# Patient Record
Sex: FEMALE | Race: WHITE | Employment: OTHER | URBAN - METROPOLITAN AREA
[De-identification: names, ages, dates, MRNs, and addresses within clinical notes are randomized per-mention and may not be internally consistent; named-entity substitution may affect disease eponyms.]

---

## 2019-01-23 ENCOUNTER — HOSPITAL ENCOUNTER (OUTPATIENT)
Dept: PREADMISSION TESTING | Age: 69
Discharge: HOME | End: 2019-01-23
Payer: MEDICARE

## 2019-01-23 VITALS
HEIGHT: 66 IN | WEIGHT: 189.38 LBS | DIASTOLIC BLOOD PRESSURE: 76 MMHG | TEMPERATURE: 97.7 F | RESPIRATION RATE: 18 BRPM | SYSTOLIC BLOOD PRESSURE: 143 MMHG | HEART RATE: 51 BPM | BODY MASS INDEX: 30.44 KG/M2

## 2019-01-23 LAB
ABO + RH BLD: NORMAL
ANION GAP SERPL CALC-SCNC: 8 MMOL/L (ref 5–15)
APPEARANCE UR: ABNORMAL
ATRIAL RATE: 52 BPM
BACTERIA URNS QL MICRO: ABNORMAL /HPF
BILIRUB UR QL: NEGATIVE
BLOOD GROUP ANTIBODIES SERPL: NORMAL
BUN SERPL-MCNC: 21 MG/DL (ref 6–20)
BUN/CREAT SERPL: 22 (ref 12–20)
CALCIUM SERPL-MCNC: 9.8 MG/DL (ref 8.5–10.1)
CALCULATED P AXIS, ECG09: 35 DEGREES
CALCULATED R AXIS, ECG10: -22 DEGREES
CALCULATED T AXIS, ECG11: 129 DEGREES
CHLORIDE SERPL-SCNC: 103 MMOL/L (ref 97–108)
CO2 SERPL-SCNC: 28 MMOL/L (ref 21–32)
COLOR UR: ABNORMAL
CREAT SERPL-MCNC: 0.95 MG/DL (ref 0.55–1.02)
DIAGNOSIS, 93000: NORMAL
DIGOXIN SERPL-MCNC: 1.3 NG/ML (ref 0.9–2)
EPITH CASTS URNS QL MICRO: ABNORMAL /LPF
ERYTHROCYTE [DISTWIDTH] IN BLOOD BY AUTOMATED COUNT: 13.8 % (ref 11.5–14.5)
EST. AVERAGE GLUCOSE BLD GHB EST-MCNC: 166 MG/DL
GLUCOSE SERPL-MCNC: 125 MG/DL (ref 65–100)
GLUCOSE UR STRIP.AUTO-MCNC: NEGATIVE MG/DL
HBA1C MFR BLD: 7.4 % (ref 4.2–6.3)
HCT VFR BLD AUTO: 43.2 % (ref 35–47)
HGB BLD-MCNC: 14.2 G/DL (ref 11.5–16)
HGB UR QL STRIP: NEGATIVE
INR PPP: 1 (ref 0.9–1.1)
KETONES UR QL STRIP.AUTO: NEGATIVE MG/DL
LEUKOCYTE ESTERASE UR QL STRIP.AUTO: ABNORMAL
MCH RBC QN AUTO: 29.8 PG (ref 26–34)
MCHC RBC AUTO-ENTMCNC: 32.9 G/DL (ref 30–36.5)
MCV RBC AUTO: 90.6 FL (ref 80–99)
NITRITE UR QL STRIP.AUTO: NEGATIVE
NRBC # BLD: 0 K/UL (ref 0–0.01)
NRBC BLD-RTO: 0 PER 100 WBC
P-R INTERVAL, ECG05: 172 MS
PH UR STRIP: 5 [PH] (ref 5–8)
PLATELET # BLD AUTO: 309 K/UL (ref 150–400)
PMV BLD AUTO: 10.2 FL (ref 8.9–12.9)
POTASSIUM SERPL-SCNC: 4.8 MMOL/L (ref 3.5–5.1)
PROT UR STRIP-MCNC: NEGATIVE MG/DL
PROTHROMBIN TIME: 10.6 SEC (ref 9–11.1)
Q-T INTERVAL, ECG07: 502 MS
QRS DURATION, ECG06: 78 MS
QTC CALCULATION (BEZET), ECG08: 466 MS
RBC # BLD AUTO: 4.77 M/UL (ref 3.8–5.2)
RBC #/AREA URNS HPF: ABNORMAL /HPF (ref 0–5)
SODIUM SERPL-SCNC: 139 MMOL/L (ref 136–145)
SP GR UR REFRACTOMETRY: 1.02 (ref 1–1.03)
SPECIMEN EXP DATE BLD: NORMAL
UA: UC IF INDICATED,UAUC: ABNORMAL
UROBILINOGEN UR QL STRIP.AUTO: 0.2 EU/DL (ref 0.2–1)
VENTRICULAR RATE, ECG03: 52 BPM
WBC # BLD AUTO: 9 K/UL (ref 3.6–11)
WBC URNS QL MICRO: ABNORMAL /HPF (ref 0–4)

## 2019-01-23 PROCEDURE — 86900 BLOOD TYPING SEROLOGIC ABO: CPT

## 2019-01-23 PROCEDURE — 80048 BASIC METABOLIC PNL TOTAL CA: CPT

## 2019-01-23 PROCEDURE — 36415 COLL VENOUS BLD VENIPUNCTURE: CPT

## 2019-01-23 PROCEDURE — 85610 PROTHROMBIN TIME: CPT

## 2019-01-23 PROCEDURE — 85027 COMPLETE CBC AUTOMATED: CPT

## 2019-01-23 PROCEDURE — 80162 ASSAY OF DIGOXIN TOTAL: CPT

## 2019-01-23 PROCEDURE — 93005 ELECTROCARDIOGRAM TRACING: CPT

## 2019-01-23 PROCEDURE — 83036 HEMOGLOBIN GLYCOSYLATED A1C: CPT

## 2019-01-23 PROCEDURE — 81001 URINALYSIS AUTO W/SCOPE: CPT

## 2019-01-23 PROCEDURE — 87086 URINE CULTURE/COLONY COUNT: CPT

## 2019-01-23 RX ORDER — DULOXETIN HYDROCHLORIDE 60 MG/1
60 CAPSULE, DELAYED RELEASE ORAL DAILY
COMMUNITY
End: 2022-04-05

## 2019-01-23 RX ORDER — GLIPIZIDE 5 MG/1
5 TABLET ORAL 2 TIMES DAILY
COMMUNITY

## 2019-01-23 RX ORDER — METFORMIN HYDROCHLORIDE 1000 MG/1
1000 TABLET ORAL 2 TIMES DAILY WITH MEALS
COMMUNITY
End: 2022-03-14

## 2019-01-23 RX ORDER — DIGOXIN 250 MCG
0.25 TABLET ORAL DAILY
COMMUNITY
End: 2022-03-14

## 2019-01-23 RX ORDER — DILTIAZEM HYDROCHLORIDE EXTENDED-RELEASE TABLETS 240 MG/1
240 TABLET, EXTENDED RELEASE ORAL DAILY
COMMUNITY
End: 2022-04-05

## 2019-01-23 RX ORDER — SOTALOL HYDROCHLORIDE 120 MG/1
120 TABLET ORAL 2 TIMES DAILY
COMMUNITY

## 2019-01-23 RX ORDER — NEBIVOLOL 10 MG/1
10 TABLET ORAL DAILY
COMMUNITY
End: 2022-04-05

## 2019-01-23 RX ORDER — ATORVASTATIN CALCIUM 20 MG/1
20 TABLET, FILM COATED ORAL DAILY
COMMUNITY

## 2019-01-23 RX ORDER — ALLOPURINOL 300 MG/1
300 TABLET ORAL DAILY
COMMUNITY

## 2019-01-23 RX ORDER — METOPROLOL TARTRATE 25 MG/1
12.5 TABLET, FILM COATED ORAL 2 TIMES DAILY
COMMUNITY
End: 2019-01-23

## 2019-01-24 LAB
BACTERIA SPEC CULT: NORMAL
CC UR VC: NORMAL
SERVICE CMNT-IMP: NORMAL
SERVICE CMNT-IMP: NORMAL

## 2019-01-24 NOTE — PERIOP NOTES
Dr. Malathi Prince office called and voice message left with Radha Rodrigues MA, regarding the following abnormal lab values:  Glucose-125 (H), JdmV0P-6.4 (H), BUN-21 (H). Patient's EKG was sent to anesthesiology for review and approved by Dr. Asiya Fabian. All labs and EKG faxed to Dr. Malathi Prince office.

## 2019-01-31 NOTE — H&P
Ebonie Fair  Location: [place] [address] [address]  Patient #: [id number]  : 1950   / Language: [place] / Race: White  Female      History of Present Illness  The patient is a 77year old female who presents for a Recheck of Neck pain. This condition occurred without any known injury. The last clinic visit was month(s) ago. Symptoms include neck pain and neck stiffness. Symptoms are located in the entire neck. The patient describes the pain as aching and burning. Onset was gradual 2 month(s) ago. The symptoms occur constantly. The patient describes symptoms as moderate in severity and worsening. Symptoms are exacerbated by neck movement. The patient was previously evaluated in this clinic year(s) ago. Past evaluation has included cervical spine x-rays and cervical spine MRI. Past treatment has included spinal surgery (). Note for \"Neck pain\":    Ms. Juan Pablo Conte comes in today for evaluation of neck and left arm pain.  She states that the pain began about a week ago. Orquidea Lira does not recall any particular injuries.  The pain radiates somewhat into the left hand.  No balance problems. Patient's history is significant for a previous cervical fusion several years ago.       Problem List/Past Medical  Essential Hypertension    Patellofemoral arthritis of right knee (716.96  M17.11)    Chronic pain of right knee (719.46  M25.561)    Subluxation of peroneal tendon (837.0) (837.0  S93.06XA)    REVIEW OF SYSTEMS: Systems were reviewed by the provider.    Posterior tibial tendinitis, left (726.72  Y32.645)    Trochanteric bursitis, left hip (726.5  M70.62)    Effusion, right knee (719.06  M25.461)    RADICULITIS (723.4)    Patient was referred to their primary care physician for Blood Pressure screening.    Pain and swelling of ankle, left (719.47  M25.572, M25.472)    Surgical followup (V67.00  Z09)    Tear of peroneal tendon, left, subsequent encounter (V58.89  B95.880C)    CERVICALGIA (723.1)    Weight above 97th percentile (V49.89  Z78.9)    Osteochondral lesion of talar dome (733.90  M89.9, M94.9)    Tear of peroneal tendon, left, initial encounter (844.8  S86.312A)    DDD (722.4)    SPONDYLOSIS (721.0)      Allergies   No Known Drug Allergies       Family History   Diabetes Mellitus    Arthritis    Hypertension    Cancer    Heart Disease    Cerebrovascular Accident      Social History   Caffeine use  : Drinks tea 3-4 times a day. Sun Exposure  : rarely  Tobacco use   Never smoker. Seat Belt Use  : always  Illicit drug use  : none  HIV risk factors  : no  Alcohol use  : Never drinks. Medication History  Aspirin  (81MG Tablet Chewable, Oral) Active. Medications Reconciled     Past Surgical History  Spinal Surgery    Tonsillectomy    Spinal Decompression   lower back  Tubal Ligation    Hysterectomy   non-cancerous: partial and abdominal    Diagnostic Studies History   Cervical Spine X-ray   Date: 12/16/2014, Results: . AP and lateral x-rays of the cervical spine show a stable fusion at C6-7. Instrumentation is good position. There is severe degenerative disc disease with spondylosis and significant disc space collapse at C5-6. There is significant facet arthrosis worse on the left throughout the cervical spine. Other Problems   Oophorectomy bilateral  Diabetes Mellitus    Depression    Arthritis    Treatment options were discussed with the patient in full.    Peroneal tendinitis (726.79) (726.79  M76.70)    Unspecified Diagnosis      Vitals  Weight: 183 lb   Height: 66 in   Body Surface Area: 1.93 m²   Body Mass Index: 29.54 kg/m²        Physical Exam  Neurologic  Overall Assessment of Muscle Strength and Tone reveals  Upper Extremities - Right Deltoid - 5/5. Left Deltoid - 3+/5. Right Bicep - 5/5. Left Bicep - 5/5. Right Tricep - 5/5. Left Tricep - 5/5. Right Wrist Extensors - 5/5. Left Wrist Extensors - 3+/5. Right Wrist Flexors - 5/5. Left Wrist Flexors - 5/5. Right Intrinsics - 5/5.  Left Intrinsics - 5/5. General Assessment of Reflexes  Right Hand - Viera's sign is negative in the right hand. Left Hand - Viera's sign is negative in the left hand. Reflexes (Dermatomes)  2/2 Normal - Left Bicep (C5-6), Left Tricep (C7-8), Left Brachioradialis (C5-6), Right Bicep (C5-6), Right Tricep (C7-8) and Right Brachioradialis (C5-6). Musculoskeletal  Global Assessment  Examination of related systems reveals - well-developed, well-nourished, in no acute distress, alert and oriented x 3 and normal coordination. Gait and Station - normal gait and station and normal posture. Spine/Ribs/Pelvis  Cervical Spine - Evaluation of related systems reveals - no lymphadenopathy and neurovascularly intact bilaterally. Inspection and Palpation - Tenderness - moderate and localized. Assessment of pain reveals the following findings: - Location - cervical area, upper trapezius area, (R), upper trapezius area, (L), mid scapular area, (R) and mid scapular area, (L). ROJM - Flexion - 85 °. Right Lateral Flexion - 35 °. Left Lateral Flexion - 35 °. Extension - 70 °. Right Rotation - 80 °. Left Rotation - . Cervical Spine - Functional Testing - Foraminal Compression/Spurling's Test negative, Shoulder Depression Test negative, Upper Limb Tension Test negative. Lumbosacral Spine - Examination of the lumbosacral spine reveals - no tenderness to palpation, no pain, normal strength and tone, no laxity or crepitus and normal lumbosacral spine movements. Assessment & Plan  Cervical spinal stenosis (723.0  M48.02)  Current Plans  Pt Education - How to access health information online: discussed with patient and provided information. Pt Education - Educational materials were provided.: discussed with patient and provided information.   X-RAY EXAM OF CERVICAL SPINE MIN 4 VIEWS (33736)  MRI CERVICAL SPINE W/O DYE (35197) (please call pt to schedule; faxed to [phone] on 1/8/19)  Presurgical planning was preformed with the patient today  Surgery to be scheduled  S/P cervical spinal fusion (V45.4  Z98.1)  CERVICALGIA (723.1  M54.2)  RADICULITIS (723.4  M54.12)  Impression: Ms. Dinorah Dickinson has left sided radicular symptoms. She does have significant junctional degeneration above her previous fusion. She has significant spondylosis at C5-C6 above her prior fusion. She has foraminal stenosis. She's had conservative treatment and the past. I like to repeat her MRI. Ultimately she is a candidate for revision ACDF C5-C7. The risks and benefits were discussed at length with the patient and the patient has elected to proceed. Indications for surgery include failed conservative treatment. Alternative treatments, risks and the perioperative course were discussed with the patient. All questions were answered. The risks and benefits of the procedure were explained. Benefits include definitive diagnosis, relief of pain, elimination of deformity and improved function. Risks of surgery including bleeding, infection, weakness, numbness, CSF leak, failure to improve symptoms, exacerbation of medical co-morbidities and even death were discussed with the patient. Date of Surgery Update:  Lori Sosa was seen and examined. History and physical has been reviewed. The patient has been examined.  There have been no significant clinical changes since the completion of the originally dated History and Physical.    Signed By: Riki Bella MD     February 6, 2019 7:20 AM             Signed by Riki Bella MD

## 2019-02-06 ENCOUNTER — ANESTHESIA EVENT (OUTPATIENT)
Dept: SURGERY | Age: 69
End: 2019-02-06
Payer: MEDICARE

## 2019-02-06 ENCOUNTER — APPOINTMENT (OUTPATIENT)
Dept: GENERAL RADIOLOGY | Age: 69
End: 2019-02-06
Attending: ORTHOPAEDIC SURGERY
Payer: MEDICARE

## 2019-02-06 ENCOUNTER — ANESTHESIA (OUTPATIENT)
Dept: SURGERY | Age: 69
End: 2019-02-06
Payer: MEDICARE

## 2019-02-06 ENCOUNTER — HOSPITAL ENCOUNTER (OUTPATIENT)
Age: 69
Setting detail: OBSERVATION
Discharge: HOME | End: 2019-02-07
Attending: ORTHOPAEDIC SURGERY | Admitting: ORTHOPAEDIC SURGERY
Payer: MEDICARE

## 2019-02-06 DIAGNOSIS — M48.02 CERVICAL STENOSIS OF SPINE: Primary | ICD-10-CM

## 2019-02-06 LAB
GLUCOSE BLD STRIP.AUTO-MCNC: 136 MG/DL (ref 65–100)
GLUCOSE BLD STRIP.AUTO-MCNC: 164 MG/DL (ref 65–100)
GLUCOSE BLD STRIP.AUTO-MCNC: 168 MG/DL (ref 65–100)
GLUCOSE BLD STRIP.AUTO-MCNC: 195 MG/DL (ref 65–100)
SERVICE CMNT-IMP: ABNORMAL

## 2019-02-06 PROCEDURE — 77030018836 HC SOL IRR NACL ICUM -A: Performed by: ORTHOPAEDIC SURGERY

## 2019-02-06 PROCEDURE — 1713 HC MISC IMPL ORTHOPEDIC: Performed by: ORTHOPAEDIC SURGERY

## 2019-02-06 PROCEDURE — C1713 ANCHOR/SCREW BN/BN,TIS/BN: HCPCS | Performed by: ORTHOPAEDIC SURGERY

## 2019-02-06 PROCEDURE — 77030002933 HC SUT MCRYL J&J -A: Performed by: ORTHOPAEDIC SURGERY

## 2019-02-06 PROCEDURE — 74011000272 HC RX REV CODE- 272: Performed by: ORTHOPAEDIC SURGERY

## 2019-02-06 PROCEDURE — 77030004391 HC BUR FLUT MEDT -C: Performed by: ORTHOPAEDIC SURGERY

## 2019-02-06 PROCEDURE — 77030026438 HC STYL ET INTUB CARD -A: Performed by: ANESTHESIOLOGY

## 2019-02-06 PROCEDURE — 74011250637 HC RX REV CODE- 250/637: Performed by: ANESTHESIOLOGY

## 2019-02-06 PROCEDURE — 74011250637 HC RX REV CODE- 250/637: Performed by: PHYSICIAN ASSISTANT

## 2019-02-06 PROCEDURE — 77030013567 HC DRN WND RESERV BARD -A: Performed by: ORTHOPAEDIC SURGERY

## 2019-02-06 PROCEDURE — 99218 HC RM OBSERVATION: CPT

## 2019-02-06 PROCEDURE — 74011636637 HC RX REV CODE- 636/637: Performed by: PHYSICIAN ASSISTANT

## 2019-02-06 PROCEDURE — 77030013079 HC BLNKT BAIR HGGR 3M -A: Performed by: ANESTHESIOLOGY

## 2019-02-06 PROCEDURE — 77030011267 HC ELECTRD BLD COVD -A: Performed by: ORTHOPAEDIC SURGERY

## 2019-02-06 PROCEDURE — 77030034475 HC MISC IMPL SPN: Performed by: ORTHOPAEDIC SURGERY

## 2019-02-06 PROCEDURE — 77030012406 HC DRN WND PENRS BARD -A: Performed by: ORTHOPAEDIC SURGERY

## 2019-02-06 PROCEDURE — 77030012893

## 2019-02-06 PROCEDURE — 74011250636 HC RX REV CODE- 250/636

## 2019-02-06 PROCEDURE — 77030032490 HC SLV COMPR SCD KNE COVD -B: Performed by: ORTHOPAEDIC SURGERY

## 2019-02-06 PROCEDURE — 77030031139 HC SUT VCRL2 J&J -A: Performed by: ORTHOPAEDIC SURGERY

## 2019-02-06 PROCEDURE — 51798 US URINE CAPACITY MEASURE: CPT

## 2019-02-06 PROCEDURE — 74011000250 HC RX REV CODE- 250

## 2019-02-06 PROCEDURE — 76060000035 HC ANESTHESIA 2 TO 2.5 HR: Performed by: ORTHOPAEDIC SURGERY

## 2019-02-06 PROCEDURE — 82962 GLUCOSE BLOOD TEST: CPT

## 2019-02-06 PROCEDURE — 76210000016 HC OR PH I REC 1 TO 1.5 HR: Performed by: ORTHOPAEDIC SURGERY

## 2019-02-06 PROCEDURE — 77030003666 HC NDL SPINAL BD -A: Performed by: ORTHOPAEDIC SURGERY

## 2019-02-06 PROCEDURE — 74011250636 HC RX REV CODE- 250/636: Performed by: PHYSICIAN ASSISTANT

## 2019-02-06 PROCEDURE — 77030020274 HC MISC IMPL ORTHOPEDIC: Performed by: ORTHOPAEDIC SURGERY

## 2019-02-06 PROCEDURE — 74011250636 HC RX REV CODE- 250/636: Performed by: ANESTHESIOLOGY

## 2019-02-06 PROCEDURE — 77030029099 HC BN WAX SSPC -A: Performed by: ORTHOPAEDIC SURGERY

## 2019-02-06 PROCEDURE — 74011000250 HC RX REV CODE- 250: Performed by: ORTHOPAEDIC SURGERY

## 2019-02-06 PROCEDURE — 72040 X-RAY EXAM NECK SPINE 2-3 VW: CPT

## 2019-02-06 PROCEDURE — 77030038600 HC TU BPLR IRR DISP STRY -B: Performed by: ORTHOPAEDIC SURGERY

## 2019-02-06 PROCEDURE — V2790 AMNIOTIC MEMBRANE: HCPCS | Performed by: ORTHOPAEDIC SURGERY

## 2019-02-06 PROCEDURE — 77030003832 HC BIT DRL ATLNTS MEDT -B: Performed by: ORTHOPAEDIC SURGERY

## 2019-02-06 PROCEDURE — 76010000171 HC OR TIME 2 TO 2.5 HR INTENSV-TIER 1: Performed by: ORTHOPAEDIC SURGERY

## 2019-02-06 PROCEDURE — 77030037713 HC CLOSR DEV INCIS ZIP STRY -B: Performed by: ORTHOPAEDIC SURGERY

## 2019-02-06 PROCEDURE — 77030008684 HC TU ET CUF COVD -B: Performed by: ANESTHESIOLOGY

## 2019-02-06 PROCEDURE — 76000 FLUOROSCOPY <1 HR PHYS/QHP: CPT

## 2019-02-06 DEVICE — Z DUP USE 2602123 GRAFT HUM TISS 3CMX 4CM AMNIO MEM VERSASHIELD: Type: IMPLANTABLE DEVICE | Site: SPINE CERVICAL | Status: FUNCTIONAL

## 2019-02-06 DEVICE — SCREW 3120514 4.0 X 14 SELF DRILL VAR
Type: IMPLANTABLE DEVICE | Site: SPINE CERVICAL | Status: FUNCTIONAL
Brand: ATLANTIS® ANTERIOR CERVICAL PLATE SYSTEM

## 2019-02-06 DEVICE — GRAFT BNE SUB SM CANC FRZN MORSELIZED W/ VIABLE CELL: Type: IMPLANTABLE DEVICE | Site: SPINE CERVICAL | Status: FUNCTIONAL

## 2019-02-06 RX ORDER — SODIUM CHLORIDE 0.9 % (FLUSH) 0.9 %
5-40 SYRINGE (ML) INJECTION AS NEEDED
Status: DISCONTINUED | OUTPATIENT
Start: 2019-02-06 | End: 2019-02-06 | Stop reason: HOSPADM

## 2019-02-06 RX ORDER — ONDANSETRON 2 MG/ML
4 INJECTION INTRAMUSCULAR; INTRAVENOUS AS NEEDED
Status: DISCONTINUED | OUTPATIENT
Start: 2019-02-06 | End: 2019-02-06 | Stop reason: HOSPADM

## 2019-02-06 RX ORDER — ROCURONIUM BROMIDE 10 MG/ML
INJECTION, SOLUTION INTRAVENOUS AS NEEDED
Status: DISCONTINUED | OUTPATIENT
Start: 2019-02-06 | End: 2019-02-06 | Stop reason: HOSPADM

## 2019-02-06 RX ORDER — AMOXICILLIN 250 MG
1 CAPSULE ORAL 2 TIMES DAILY
Status: DISCONTINUED | OUTPATIENT
Start: 2019-02-06 | End: 2019-02-07 | Stop reason: HOSPADM

## 2019-02-06 RX ORDER — SODIUM CHLORIDE 0.9 % (FLUSH) 0.9 %
5-40 SYRINGE (ML) INJECTION EVERY 8 HOURS
Status: DISCONTINUED | OUTPATIENT
Start: 2019-02-06 | End: 2019-02-06 | Stop reason: HOSPADM

## 2019-02-06 RX ORDER — SODIUM CHLORIDE 0.9 % (FLUSH) 0.9 %
5-40 SYRINGE (ML) INJECTION EVERY 8 HOURS
Status: DISCONTINUED | OUTPATIENT
Start: 2019-02-06 | End: 2019-02-07 | Stop reason: HOSPADM

## 2019-02-06 RX ORDER — SODIUM CHLORIDE, SODIUM LACTATE, POTASSIUM CHLORIDE, CALCIUM CHLORIDE 600; 310; 30; 20 MG/100ML; MG/100ML; MG/100ML; MG/100ML
INJECTION, SOLUTION INTRAVENOUS
Status: DISCONTINUED | OUTPATIENT
Start: 2019-02-06 | End: 2019-02-06 | Stop reason: HOSPADM

## 2019-02-06 RX ORDER — PROPOFOL 10 MG/ML
INJECTION, EMULSION INTRAVENOUS
Status: DISCONTINUED | OUTPATIENT
Start: 2019-02-06 | End: 2019-02-06 | Stop reason: HOSPADM

## 2019-02-06 RX ORDER — DEXTROSE 50 % IN WATER (D50W) INTRAVENOUS SYRINGE
12.5-25 AS NEEDED
Status: DISCONTINUED | OUTPATIENT
Start: 2019-02-06 | End: 2019-02-07 | Stop reason: HOSPADM

## 2019-02-06 RX ORDER — GLIPIZIDE 5 MG/1
5 TABLET ORAL DAILY
Status: DISCONTINUED | OUTPATIENT
Start: 2019-02-07 | End: 2019-02-07 | Stop reason: HOSPADM

## 2019-02-06 RX ORDER — CYCLOBENZAPRINE HCL 10 MG
10 TABLET ORAL
Status: DISCONTINUED | OUTPATIENT
Start: 2019-02-06 | End: 2019-02-07 | Stop reason: HOSPADM

## 2019-02-06 RX ORDER — GLYCOPYRROLATE 0.2 MG/ML
INJECTION INTRAMUSCULAR; INTRAVENOUS AS NEEDED
Status: DISCONTINUED | OUTPATIENT
Start: 2019-02-06 | End: 2019-02-06 | Stop reason: HOSPADM

## 2019-02-06 RX ORDER — ANASTROZOLE 1 MG/1
1 TABLET ORAL DAILY
Status: DISCONTINUED | OUTPATIENT
Start: 2019-02-07 | End: 2019-02-07 | Stop reason: HOSPADM

## 2019-02-06 RX ORDER — MORPHINE SULFATE 10 MG/ML
2 INJECTION, SOLUTION INTRAMUSCULAR; INTRAVENOUS
Status: DISCONTINUED | OUTPATIENT
Start: 2019-02-06 | End: 2019-02-06 | Stop reason: HOSPADM

## 2019-02-06 RX ORDER — SODIUM CHLORIDE, SODIUM LACTATE, POTASSIUM CHLORIDE, CALCIUM CHLORIDE 600; 310; 30; 20 MG/100ML; MG/100ML; MG/100ML; MG/100ML
25 INJECTION, SOLUTION INTRAVENOUS CONTINUOUS
Status: DISCONTINUED | OUTPATIENT
Start: 2019-02-06 | End: 2019-02-06 | Stop reason: HOSPADM

## 2019-02-06 RX ORDER — DEXMEDETOMIDINE HYDROCHLORIDE 4 UG/ML
INJECTION, SOLUTION INTRAVENOUS AS NEEDED
Status: DISCONTINUED | OUTPATIENT
Start: 2019-02-06 | End: 2019-02-06 | Stop reason: HOSPADM

## 2019-02-06 RX ORDER — SODIUM CHLORIDE, SODIUM LACTATE, POTASSIUM CHLORIDE, CALCIUM CHLORIDE 600; 310; 30; 20 MG/100ML; MG/100ML; MG/100ML; MG/100ML
125 INJECTION, SOLUTION INTRAVENOUS CONTINUOUS
Status: DISCONTINUED | OUTPATIENT
Start: 2019-02-06 | End: 2019-02-06 | Stop reason: HOSPADM

## 2019-02-06 RX ORDER — ATORVASTATIN CALCIUM 20 MG/1
20 TABLET, FILM COATED ORAL DAILY
Status: DISCONTINUED | OUTPATIENT
Start: 2019-02-07 | End: 2019-02-07 | Stop reason: HOSPADM

## 2019-02-06 RX ORDER — LIDOCAINE HYDROCHLORIDE 20 MG/ML
INJECTION, SOLUTION EPIDURAL; INFILTRATION; INTRACAUDAL; PERINEURAL AS NEEDED
Status: DISCONTINUED | OUTPATIENT
Start: 2019-02-06 | End: 2019-02-06 | Stop reason: HOSPADM

## 2019-02-06 RX ORDER — FENTANYL CITRATE 50 UG/ML
50 INJECTION, SOLUTION INTRAMUSCULAR; INTRAVENOUS AS NEEDED
Status: DISCONTINUED | OUTPATIENT
Start: 2019-02-06 | End: 2019-02-06 | Stop reason: HOSPADM

## 2019-02-06 RX ORDER — DIPHENHYDRAMINE HCL 25 MG
25 CAPSULE ORAL
Status: DISCONTINUED | OUTPATIENT
Start: 2019-02-06 | End: 2019-02-07 | Stop reason: HOSPADM

## 2019-02-06 RX ORDER — MIDAZOLAM HYDROCHLORIDE 1 MG/ML
1 INJECTION, SOLUTION INTRAMUSCULAR; INTRAVENOUS AS NEEDED
Status: DISCONTINUED | OUTPATIENT
Start: 2019-02-06 | End: 2019-02-06 | Stop reason: HOSPADM

## 2019-02-06 RX ORDER — SOTALOL HYDROCHLORIDE 80 MG/1
120 TABLET ORAL EVERY 12 HOURS
Status: DISCONTINUED | OUTPATIENT
Start: 2019-02-06 | End: 2019-02-06

## 2019-02-06 RX ORDER — CEFAZOLIN SODIUM/WATER 2 G/20 ML
2 SYRINGE (ML) INTRAVENOUS EVERY 8 HOURS
Status: COMPLETED | OUTPATIENT
Start: 2019-02-06 | End: 2019-02-07

## 2019-02-06 RX ORDER — NEBIVOLOL 5 MG/1
10 TABLET ORAL DAILY
Status: DISCONTINUED | OUTPATIENT
Start: 2019-02-07 | End: 2019-02-07 | Stop reason: HOSPADM

## 2019-02-06 RX ORDER — MIDAZOLAM HYDROCHLORIDE 1 MG/ML
INJECTION, SOLUTION INTRAMUSCULAR; INTRAVENOUS AS NEEDED
Status: DISCONTINUED | OUTPATIENT
Start: 2019-02-06 | End: 2019-02-06 | Stop reason: HOSPADM

## 2019-02-06 RX ORDER — LIDOCAINE HYDROCHLORIDE 10 MG/ML
0.1 INJECTION, SOLUTION EPIDURAL; INFILTRATION; INTRACAUDAL; PERINEURAL AS NEEDED
Status: DISCONTINUED | OUTPATIENT
Start: 2019-02-06 | End: 2019-02-06 | Stop reason: HOSPADM

## 2019-02-06 RX ORDER — SODIUM CHLORIDE 0.9 % (FLUSH) 0.9 %
5-40 SYRINGE (ML) INJECTION AS NEEDED
Status: DISCONTINUED | OUTPATIENT
Start: 2019-02-06 | End: 2019-02-07 | Stop reason: HOSPADM

## 2019-02-06 RX ORDER — FACIAL-BODY WIPES
10 EACH TOPICAL DAILY PRN
Status: DISCONTINUED | OUTPATIENT
Start: 2019-02-08 | End: 2019-02-07 | Stop reason: HOSPADM

## 2019-02-06 RX ORDER — DIGOXIN 125 MCG
0.25 TABLET ORAL
Status: DISCONTINUED | OUTPATIENT
Start: 2019-02-07 | End: 2019-02-07 | Stop reason: HOSPADM

## 2019-02-06 RX ORDER — SCOLOPAMINE TRANSDERMAL SYSTEM 1 MG/1
1 PATCH, EXTENDED RELEASE TRANSDERMAL ONCE
Status: DISCONTINUED | OUTPATIENT
Start: 2019-02-06 | End: 2019-02-07 | Stop reason: HOSPADM

## 2019-02-06 RX ORDER — MAGNESIUM SULFATE 100 %
4 CRYSTALS MISCELLANEOUS AS NEEDED
Status: DISCONTINUED | OUTPATIENT
Start: 2019-02-06 | End: 2019-02-07 | Stop reason: HOSPADM

## 2019-02-06 RX ORDER — FENTANYL CITRATE 50 UG/ML
25 INJECTION, SOLUTION INTRAMUSCULAR; INTRAVENOUS
Status: COMPLETED | OUTPATIENT
Start: 2019-02-06 | End: 2019-02-06

## 2019-02-06 RX ORDER — EPHEDRINE SULFATE 50 MG/ML
INJECTION, SOLUTION INTRAVENOUS AS NEEDED
Status: DISCONTINUED | OUTPATIENT
Start: 2019-02-06 | End: 2019-02-06 | Stop reason: HOSPADM

## 2019-02-06 RX ORDER — ASPIRIN 81 MG/1
81 TABLET ORAL DAILY
Status: DISCONTINUED | OUTPATIENT
Start: 2019-02-07 | End: 2019-02-07 | Stop reason: HOSPADM

## 2019-02-06 RX ORDER — DEXAMETHASONE SODIUM PHOSPHATE 10 MG/ML
10 INJECTION INTRAMUSCULAR; INTRAVENOUS EVERY 6 HOURS
Status: COMPLETED | OUTPATIENT
Start: 2019-02-06 | End: 2019-02-07

## 2019-02-06 RX ORDER — OXYCODONE HYDROCHLORIDE 5 MG/1
5 TABLET ORAL
Status: DISCONTINUED | OUTPATIENT
Start: 2019-02-06 | End: 2019-02-07 | Stop reason: HOSPADM

## 2019-02-06 RX ORDER — INSULIN LISPRO 100 [IU]/ML
INJECTION, SOLUTION INTRAVENOUS; SUBCUTANEOUS
Status: DISCONTINUED | OUTPATIENT
Start: 2019-02-06 | End: 2019-02-07 | Stop reason: HOSPADM

## 2019-02-06 RX ORDER — DULOXETIN HYDROCHLORIDE 60 MG/1
60 CAPSULE, DELAYED RELEASE ORAL DAILY
Status: DISCONTINUED | OUTPATIENT
Start: 2019-02-07 | End: 2019-02-07 | Stop reason: HOSPADM

## 2019-02-06 RX ORDER — CEFAZOLIN SODIUM/WATER 2 G/20 ML
2 SYRINGE (ML) INTRAVENOUS ONCE
Status: COMPLETED | OUTPATIENT
Start: 2019-02-06 | End: 2019-02-06

## 2019-02-06 RX ORDER — NALOXONE HYDROCHLORIDE 0.4 MG/ML
0.4 INJECTION, SOLUTION INTRAMUSCULAR; INTRAVENOUS; SUBCUTANEOUS AS NEEDED
Status: DISCONTINUED | OUTPATIENT
Start: 2019-02-06 | End: 2019-02-07 | Stop reason: HOSPADM

## 2019-02-06 RX ORDER — DEXAMETHASONE SODIUM PHOSPHATE 4 MG/ML
INJECTION, SOLUTION INTRA-ARTICULAR; INTRALESIONAL; INTRAMUSCULAR; INTRAVENOUS; SOFT TISSUE AS NEEDED
Status: DISCONTINUED | OUTPATIENT
Start: 2019-02-06 | End: 2019-02-06 | Stop reason: HOSPADM

## 2019-02-06 RX ORDER — PROPOFOL 10 MG/ML
INJECTION, EMULSION INTRAVENOUS AS NEEDED
Status: DISCONTINUED | OUTPATIENT
Start: 2019-02-06 | End: 2019-02-06 | Stop reason: HOSPADM

## 2019-02-06 RX ORDER — ALLOPURINOL 300 MG/1
300 TABLET ORAL DAILY
Status: DISCONTINUED | OUTPATIENT
Start: 2019-02-07 | End: 2019-02-07 | Stop reason: HOSPADM

## 2019-02-06 RX ORDER — METFORMIN HYDROCHLORIDE 500 MG/1
1000 TABLET ORAL 2 TIMES DAILY WITH MEALS
Status: DISCONTINUED | OUTPATIENT
Start: 2019-02-07 | End: 2019-02-07 | Stop reason: HOSPADM

## 2019-02-06 RX ORDER — OXYCODONE HYDROCHLORIDE 5 MG/1
5 TABLET ORAL AS NEEDED
Status: DISCONTINUED | OUTPATIENT
Start: 2019-02-06 | End: 2019-02-06 | Stop reason: HOSPADM

## 2019-02-06 RX ORDER — ONDANSETRON 2 MG/ML
INJECTION INTRAMUSCULAR; INTRAVENOUS AS NEEDED
Status: DISCONTINUED | OUTPATIENT
Start: 2019-02-06 | End: 2019-02-06 | Stop reason: HOSPADM

## 2019-02-06 RX ORDER — ACETAMINOPHEN 500 MG
1000 TABLET ORAL EVERY 6 HOURS
Status: DISCONTINUED | OUTPATIENT
Start: 2019-02-06 | End: 2019-02-07 | Stop reason: HOSPADM

## 2019-02-06 RX ORDER — SODIUM CHLORIDE 9 MG/ML
125 INJECTION, SOLUTION INTRAVENOUS CONTINUOUS
Status: DISPENSED | OUTPATIENT
Start: 2019-02-06 | End: 2019-02-07

## 2019-02-06 RX ORDER — DILTIAZEM HYDROCHLORIDE 240 MG/1
240 CAPSULE, COATED, EXTENDED RELEASE ORAL DAILY
Status: DISCONTINUED | OUTPATIENT
Start: 2019-02-07 | End: 2019-02-07 | Stop reason: HOSPADM

## 2019-02-06 RX ORDER — SOTALOL HYDROCHLORIDE 80 MG/1
120 TABLET ORAL EVERY 12 HOURS
Status: DISCONTINUED | OUTPATIENT
Start: 2019-02-06 | End: 2019-02-07 | Stop reason: HOSPADM

## 2019-02-06 RX ORDER — MORPHINE SULFATE 2 MG/ML
2 INJECTION, SOLUTION INTRAMUSCULAR; INTRAVENOUS
Status: DISCONTINUED | OUTPATIENT
Start: 2019-02-06 | End: 2019-02-07 | Stop reason: HOSPADM

## 2019-02-06 RX ORDER — FENTANYL CITRATE 50 UG/ML
INJECTION, SOLUTION INTRAMUSCULAR; INTRAVENOUS AS NEEDED
Status: DISCONTINUED | OUTPATIENT
Start: 2019-02-06 | End: 2019-02-06 | Stop reason: HOSPADM

## 2019-02-06 RX ORDER — ONDANSETRON 2 MG/ML
4 INJECTION INTRAMUSCULAR; INTRAVENOUS
Status: ACTIVE | OUTPATIENT
Start: 2019-02-06 | End: 2019-02-07

## 2019-02-06 RX ORDER — SODIUM CHLORIDE 9 MG/ML
25 INJECTION, SOLUTION INTRAVENOUS CONTINUOUS
Status: DISCONTINUED | OUTPATIENT
Start: 2019-02-06 | End: 2019-02-06 | Stop reason: HOSPADM

## 2019-02-06 RX ORDER — MIDAZOLAM HYDROCHLORIDE 1 MG/ML
0.5 INJECTION, SOLUTION INTRAMUSCULAR; INTRAVENOUS
Status: DISCONTINUED | OUTPATIENT
Start: 2019-02-06 | End: 2019-02-06 | Stop reason: HOSPADM

## 2019-02-06 RX ORDER — OXYCODONE HYDROCHLORIDE 5 MG/1
10 TABLET ORAL
Status: DISCONTINUED | OUTPATIENT
Start: 2019-02-06 | End: 2019-02-07 | Stop reason: HOSPADM

## 2019-02-06 RX ORDER — SUCCINYLCHOLINE CHLORIDE 20 MG/ML
INJECTION INTRAMUSCULAR; INTRAVENOUS AS NEEDED
Status: DISCONTINUED | OUTPATIENT
Start: 2019-02-06 | End: 2019-02-06 | Stop reason: HOSPADM

## 2019-02-06 RX ORDER — POLYETHYLENE GLYCOL 3350 17 G/17G
17 POWDER, FOR SOLUTION ORAL DAILY
Status: DISCONTINUED | OUTPATIENT
Start: 2019-02-07 | End: 2019-02-07 | Stop reason: HOSPADM

## 2019-02-06 RX ADMIN — DEXAMETHASONE SODIUM PHOSPHATE 10 MG: 10 INJECTION, SOLUTION INTRAMUSCULAR; INTRAVENOUS at 14:00

## 2019-02-06 RX ADMIN — FENTANYL CITRATE 25 MCG: 50 INJECTION INTRAMUSCULAR; INTRAVENOUS at 10:20

## 2019-02-06 RX ADMIN — INSULIN LISPRO 2 UNITS: 100 INJECTION, SOLUTION INTRAVENOUS; SUBCUTANEOUS at 16:30

## 2019-02-06 RX ADMIN — GLYCOPYRROLATE 0.4 MG: 0.2 INJECTION INTRAMUSCULAR; INTRAVENOUS at 07:44

## 2019-02-06 RX ADMIN — SENNOSIDES AND DOCUSATE SODIUM 1 TABLET: 8.6; 5 TABLET ORAL at 14:00

## 2019-02-06 RX ADMIN — FENTANYL CITRATE 25 MCG: 50 INJECTION INTRAMUSCULAR; INTRAVENOUS at 11:20

## 2019-02-06 RX ADMIN — OXYCODONE HYDROCHLORIDE 5 MG: 5 TABLET ORAL at 15:05

## 2019-02-06 RX ADMIN — DEXAMETHASONE SODIUM PHOSPHATE 4 MG: 4 INJECTION, SOLUTION INTRA-ARTICULAR; INTRALESIONAL; INTRAMUSCULAR; INTRAVENOUS; SOFT TISSUE at 07:55

## 2019-02-06 RX ADMIN — DEXMEDETOMIDINE HYDROCHLORIDE 8 MCG: 4 INJECTION, SOLUTION INTRAVENOUS at 08:21

## 2019-02-06 RX ADMIN — ONDANSETRON 4 MG: 2 INJECTION INTRAMUSCULAR; INTRAVENOUS at 09:05

## 2019-02-06 RX ADMIN — FENTANYL CITRATE 50 MCG: 50 INJECTION, SOLUTION INTRAMUSCULAR; INTRAVENOUS at 07:32

## 2019-02-06 RX ADMIN — PROPOFOL 75 MCG/KG/MIN: 10 INJECTION, EMULSION INTRAVENOUS at 07:34

## 2019-02-06 RX ADMIN — MORPHINE SULFATE 2 MG: 10 INJECTION INTRAVENOUS at 12:20

## 2019-02-06 RX ADMIN — SODIUM CHLORIDE, SODIUM LACTATE, POTASSIUM CHLORIDE, AND CALCIUM CHLORIDE 25 ML/HR: 600; 310; 30; 20 INJECTION, SOLUTION INTRAVENOUS at 07:20

## 2019-02-06 RX ADMIN — SODIUM CHLORIDE 125 ML/HR: 900 INJECTION, SOLUTION INTRAVENOUS at 10:15

## 2019-02-06 RX ADMIN — SUCCINYLCHOLINE CHLORIDE 140 MG: 20 INJECTION INTRAMUSCULAR; INTRAVENOUS at 07:32

## 2019-02-06 RX ADMIN — SOTALOL HYDROCHLORIDE 120 MG: 80 TABLET ORAL at 17:47

## 2019-02-06 RX ADMIN — ACETAMINOPHEN 1000 MG: 500 TABLET ORAL at 14:00

## 2019-02-06 RX ADMIN — FENTANYL CITRATE 25 MCG: 50 INJECTION INTRAMUSCULAR; INTRAVENOUS at 10:15

## 2019-02-06 RX ADMIN — DEXAMETHASONE SODIUM PHOSPHATE 10 MG: 10 INJECTION, SOLUTION INTRAMUSCULAR; INTRAVENOUS at 20:30

## 2019-02-06 RX ADMIN — MIDAZOLAM HYDROCHLORIDE 2 MG: 1 INJECTION, SOLUTION INTRAMUSCULAR; INTRAVENOUS at 07:25

## 2019-02-06 RX ADMIN — Medication 2 G: at 15:32

## 2019-02-06 RX ADMIN — ACETAMINOPHEN 1000 MG: 500 TABLET ORAL at 20:29

## 2019-02-06 RX ADMIN — SODIUM CHLORIDE, SODIUM LACTATE, POTASSIUM CHLORIDE, CALCIUM CHLORIDE: 600; 310; 30; 20 INJECTION, SOLUTION INTRAVENOUS at 07:27

## 2019-02-06 RX ADMIN — CYCLOBENZAPRINE HYDROCHLORIDE 10 MG: 10 TABLET, FILM COATED ORAL at 14:00

## 2019-02-06 RX ADMIN — Medication 2 G: at 07:35

## 2019-02-06 RX ADMIN — FENTANYL CITRATE 50 MCG: 50 INJECTION, SOLUTION INTRAMUSCULAR; INTRAVENOUS at 08:11

## 2019-02-06 RX ADMIN — FENTANYL CITRATE 25 MCG: 50 INJECTION INTRAMUSCULAR; INTRAVENOUS at 11:15

## 2019-02-06 RX ADMIN — ROCURONIUM BROMIDE 5 MG: 10 INJECTION, SOLUTION INTRAVENOUS at 07:32

## 2019-02-06 RX ADMIN — LIDOCAINE HYDROCHLORIDE 100 MG: 20 INJECTION, SOLUTION EPIDURAL; INFILTRATION; INTRACAUDAL; PERINEURAL at 07:32

## 2019-02-06 RX ADMIN — PROPOFOL 150 MG: 10 INJECTION, EMULSION INTRAVENOUS at 07:32

## 2019-02-06 RX ADMIN — SENNOSIDES AND DOCUSATE SODIUM 1 TABLET: 8.6; 5 TABLET ORAL at 20:29

## 2019-02-06 RX ADMIN — EPHEDRINE SULFATE 10 MG: 50 INJECTION, SOLUTION INTRAVENOUS at 07:43

## 2019-02-06 NOTE — PERIOP NOTES
TRANSFER - OUT REPORT:    Verbal report given to masha(name) on Pedro Pablo Kelley  being transferred to (unit) for routine post - op       Report consisted of patients Situation, Background, Assessment and   Recommendations(SBAR). Time Pre op antibiotic [id number]  Anesthesia Stop time: 7025  Crowe Present on Transfer to floor:no  Order for Crowe on Chart:no  Discharge Prescriptions with Chart:no    Information from the following report(s) SBAR and OR Summary was reviewed with the receiving nurse. Opportunity for questions and clarification was provided. Is the patient on 02? YES       L/Min 2         Is the patient on a monitor? NO    Is the nurse transporting with the patient? NO    Surgical Waiting Area notified of patient's transfer from PACU?  YES      The following personal items collected during your admission accompanied patient upon transfer:   Dental Appliance: Dental Appliances: None  Vision: Visual Aid: Glasses  Hearing Aid:    Jewelry:    Clothing: Clothing: (bag of clothes and glasses returned to patient in pacu)  Other Valuables:    Valuables sent to safe:

## 2019-02-06 NOTE — ANESTHESIA POSTPROCEDURE EVALUATION
Post-Anesthesia Evaluation and Assessment Patient: Quoc Currie MRN: [id number]  SSN: [id number] YOB: 1950  Age: 77 y.o. Sex: female Cardiovascular Function/Vital Signs Visit Vitals /72 Pulse (!) 55 Temp 36.7 °C (98 °F) Resp 17 SpO2 91% Patient is status post General anesthesia for Procedure(s): REVISION ANTERIOR CERVICAL DISCECTOMY WITH FUSION C5-C7. Nausea/Vomiting: None Postoperative hydration reviewed and adequate. Pain: 
Pain Scale 1: Numeric (0 - 10) (02/06/19 1020) Pain Intensity 1: 6 (02/06/19 1020) Managed Neurological Status:  
Neuro (WDL): Within Defined Limits (02/06/19 1015) Neuro LLE Motor Response: Purposeful (02/06/19 1015) RLE Motor Response: Purposeful (02/06/19 1015) At baseline Mental Status and Level of Consciousness: Alert and oriented to person, place, and time Pulmonary Status:  
O2 Device: Nasal cannula (02/06/19 1015) Adequate oxygenation and airway patent Complications related to anesthesia: None Post-anesthesia assessment completed. No concerns Signed By: Melissa Guerrero MD   
 February 6, 2019 Procedure(s): REVISION ANTERIOR CERVICAL DISCECTOMY WITH FUSION C5-C7. 
 
<BSHSIANPOST> Visit Vitals /72 Pulse (!) 55 Temp 36.7 °C (98 °F) Resp 17 SpO2 91%

## 2019-02-06 NOTE — ROUTINE PROCESS
Patient: Darlene Clarke MRN: [id number]  SSN: [id number]   YOB: 1950  Age: 77 y.o. Sex: female     Patient is status post Procedure(s):  REVISION ANTERIOR CERVICAL DISCECTOMY WITH FUSION C5-C7. Surgeon(s) and Role: Maddy Segal MD - Primary    Local/Dose/Irrigation:  See emar                  Peripheral IV 02/06/19 Left;Posterior Hand (Active)          Drain 02/06/19 Anterior Other (comment) (Active)   Site Assessment Clean, dry, & intact 2/6/2019  9:06 AM   Dressing Status Clean, dry, & intact 2/6/2019  9:06 AM   Status Patent; Charged;Draining 2/6/2019  9:06 AM   Drainage Description Sanguinous 2/6/2019  9:06 AM      Airway - Endotracheal Tube 02/06/19 Oral (Active)                   Dressing/Packing:  Wound Neck Anterior-Dressing Type : 4 x 4;Other (Comment)(island dressing) (02/06/19 0902)  Splint/Cast: Wound Neck Anterior-Splint Type/Material: Cervical Collar]    Other:  Crowe out at end of case; scds; drain to neck;

## 2019-02-06 NOTE — BRIEF OP NOTE
BRIEF OPERATIVE NOTE    Date of Procedure: 2/6/2019   Preoperative Diagnosis: CERVICAL SPINAL STENOSIS   CERVICALGIA  S/P CERVICAL SPINAL FUSION  Postoperative Diagnosis: CERVICAL SPINAL STENOSIS   CERVICALGIA    Procedure(s):  REVISION ANTERIOR CERVICAL DISCECTOMY WITH FUSION C5-C7  Surgeon(s) and Role: Cici Motley MD - Primary         Surgical Assistant: Bailey Logan PA-C    Surgical Staff:  Circ-1: Laure Galvez  Circ-2: Ryan GOMEZ  Physician Assistant: Fannie Huffman  Scrub RN-1: Daisy Mcmillan RN  Scrub RN-2: Gretchen Cagle Time In Time Out   Incision Start 0215    Incision Close       Anesthesia: General   Estimated Blood Loss: minimal  Specimens: * No specimens in log *   Findings: stenosis   Complications: none  Implants:   Implant Name Type Inv. Item Serial No.  Lot No. LRB No. Used Action   GRAFT BNE ELITE SHABBIR SM --  - [id number]  GRAFT BNE ELITE SHABBIR SM --  [id number] [employer]CULOSKELET[employer] 8710 N/A 1 Implanted   MEMBRANE AMNIOTIC WND 3X4CM -- VERSASHIELD - [id number]  MEMBRANE AMNIOTIC WND 3X4CM -- VERSASHIELD [id number] [employer] 3910 N/A 1 Implanted   medtronic sofamor danek interbody    N/A MEDTRONIC 33DY N/A 1 Implanted   PLATE SPNE ANT ATLANTIS 42. 5MM --  - SN/A  PLATE SPNE ANT ATLANTIS 42. 5MM --  N/A MEDTRONIC SOFAMOR DANEK N/A N/A 1 Implanted   SCR SPNE VA SD 4X14MM --  - SN/A  SCR SPNE VA SD 4X14MM --  N/A MEDTRONIC SOFAMOR DANEK N/A N/A 6 Implanted

## 2019-02-06 NOTE — ANESTHESIA PREPROCEDURE EVALUATION
Anesthetic History PONV Review of Systems / Medical History Patient summary reviewed, nursing notes reviewed and pertinent labs reviewed Pulmonary Sleep apnea Neuro/Psych Within defined limits Cardiovascular Hypertension Dysrhythmias Exercise tolerance: >4 METS 
  
GI/Hepatic/Renal 
Within defined limits Endo/Other Diabetes Arthritis Other Findings Physical Exam 
 
Airway Mallampati: II 
TM Distance: > 6 cm Neck ROM: decreased range of motion Mouth opening: Normal 
 
 Cardiovascular Regular rate and rhythm,  S1 and S2 normal,  no murmur, click, rub, or gallop Dental 
No notable dental hx Pulmonary Breath sounds clear to auscultation Abdominal 
GI exam deferred Other Findings Anesthetic Plan ASA: 2 Anesthesia type: general 
 
 
 
 
Induction: Intravenous Anesthetic plan and risks discussed with: Patient

## 2019-02-07 VITALS
RESPIRATION RATE: 16 BRPM | SYSTOLIC BLOOD PRESSURE: 129 MMHG | OXYGEN SATURATION: 93 % | HEART RATE: 60 BPM | TEMPERATURE: 97.3 F | WEIGHT: 189.38 LBS | DIASTOLIC BLOOD PRESSURE: 55 MMHG | BODY MASS INDEX: 30.57 KG/M2

## 2019-02-07 LAB
GLUCOSE BLD STRIP.AUTO-MCNC: 192 MG/DL (ref 65–100)
GLUCOSE BLD STRIP.AUTO-MCNC: 193 MG/DL (ref 65–100)
SERVICE CMNT-IMP: ABNORMAL
SERVICE CMNT-IMP: ABNORMAL

## 2019-02-07 PROCEDURE — 82962 GLUCOSE BLOOD TEST: CPT

## 2019-02-07 PROCEDURE — 74011250636 HC RX REV CODE- 250/636: Performed by: PHYSICIAN ASSISTANT

## 2019-02-07 PROCEDURE — 99218 HC RM OBSERVATION: CPT

## 2019-02-07 PROCEDURE — 74011000250 HC RX REV CODE- 250: Performed by: NURSE PRACTITIONER

## 2019-02-07 PROCEDURE — 74011636637 HC RX REV CODE- 636/637: Performed by: PHYSICIAN ASSISTANT

## 2019-02-07 PROCEDURE — 74011250637 HC RX REV CODE- 250/637: Performed by: PHYSICIAN ASSISTANT

## 2019-02-07 RX ORDER — OXYCODONE HYDROCHLORIDE 5 MG/1
5-10 TABLET ORAL
Qty: 60 TAB | Refills: 0 | Status: SHIPPED | OUTPATIENT
Start: 2019-02-07 | End: 2022-03-14

## 2019-02-07 RX ORDER — LIDOCAINE 50 MG/G
2 PATCH TOPICAL EVERY 24 HOURS
Status: DISCONTINUED | OUTPATIENT
Start: 2019-02-07 | End: 2019-02-07 | Stop reason: HOSPADM

## 2019-02-07 RX ORDER — NALOXONE HYDROCHLORIDE 4 MG/.1ML
SPRAY NASAL
Qty: 2 EACH | Refills: 0 | Status: SHIPPED | OUTPATIENT
Start: 2019-02-07 | End: 2022-03-14

## 2019-02-07 RX ADMIN — DULOXETINE HYDROCHLORIDE 60 MG: 60 CAPSULE, DELAYED RELEASE ORAL at 08:50

## 2019-02-07 RX ADMIN — OXYCODONE HYDROCHLORIDE 5 MG: 5 TABLET ORAL at 12:52

## 2019-02-07 RX ADMIN — DIGOXIN 0.25 MG: 125 TABLET ORAL at 08:58

## 2019-02-07 RX ADMIN — Medication 2 G: at 00:21

## 2019-02-07 RX ADMIN — DILTIAZEM HYDROCHLORIDE 240 MG: 240 CAPSULE, COATED, EXTENDED RELEASE ORAL at 08:52

## 2019-02-07 RX ADMIN — POLYETHYLENE GLYCOL 3350 17 G: 17 POWDER, FOR SOLUTION ORAL at 08:50

## 2019-02-07 RX ADMIN — SODIUM CHLORIDE 125 ML/HR: 900 INJECTION, SOLUTION INTRAVENOUS at 02:00

## 2019-02-07 RX ADMIN — SENNOSIDES AND DOCUSATE SODIUM 1 TABLET: 8.6; 5 TABLET ORAL at 08:49

## 2019-02-07 RX ADMIN — METFORMIN HYDROCHLORIDE 1000 MG: 500 TABLET ORAL at 07:19

## 2019-02-07 RX ADMIN — ATORVASTATIN CALCIUM 20 MG: 20 TABLET, FILM COATED ORAL at 08:52

## 2019-02-07 RX ADMIN — ANASTROZOLE 1 MG: 1 TABLET, COATED ORAL at 08:52

## 2019-02-07 RX ADMIN — SOTALOL HYDROCHLORIDE 120 MG: 80 TABLET ORAL at 04:54

## 2019-02-07 RX ADMIN — ACETAMINOPHEN 1000 MG: 500 TABLET ORAL at 07:17

## 2019-02-07 RX ADMIN — NEBIVOLOL HYDROCHLORIDE 10 MG: 5 TABLET ORAL at 08:50

## 2019-02-07 RX ADMIN — GLIPIZIDE 5 MG: 5 TABLET ORAL at 08:52

## 2019-02-07 RX ADMIN — INSULIN LISPRO 2 UNITS: 100 INJECTION, SOLUTION INTRAVENOUS; SUBCUTANEOUS at 11:24

## 2019-02-07 RX ADMIN — Medication 10 ML: at 06:00

## 2019-02-07 RX ADMIN — ALLOPURINOL 300 MG: 300 TABLET ORAL at 08:51

## 2019-02-07 RX ADMIN — INSULIN LISPRO 2 UNITS: 100 INJECTION, SOLUTION INTRAVENOUS; SUBCUTANEOUS at 07:18

## 2019-02-07 RX ADMIN — ACETAMINOPHEN 1000 MG: 500 TABLET ORAL at 01:59

## 2019-02-07 RX ADMIN — DEXAMETHASONE SODIUM PHOSPHATE 10 MG: 10 INJECTION, SOLUTION INTRAMUSCULAR; INTRAVENOUS at 07:18

## 2019-02-07 RX ADMIN — DEXAMETHASONE SODIUM PHOSPHATE 10 MG: 10 INJECTION, SOLUTION INTRAMUSCULAR; INTRAVENOUS at 01:57

## 2019-02-07 NOTE — PROGRESS NOTES
Orthopedic Spine Progress Note  Post Op day: 1 Day Post-Op    2019 8:07 AM   Admit Date: 2019  Procedure: Procedure(s):  REVISION ANTERIOR CERVICAL DISCECTOMY WITH FUSION C5-C7    Subjective:     Feliberto Sampson doing well. Pain controlled. No dysphagia. No numbness or tingling of upper extremities. Tolerating diet. No N/V. Drain in place. Pain Control:   Pain Assessment  Pain Scale 1: Numeric (0 - 10)  Pain Intensity 1: 2  Pain Onset 1: post op  Pain Location 1: Shoulder  Pain Orientation 1: Posterior  Pain Description 1: Tightness  Pain Intervention(s) 1: Medication (see MAR)    Objective:          Physical Exam:  General:  Alert and oriented. No acute distress. Heart:  Respirations unlabored. Abdomen:   Extremities: Soft, non-tender. No evidence of cyanosis. Pulses palpable in both upper and lower extremities. Neurologic:  Musculoskeletal:  No new motor deficits. Neurovascular exam within normal limits. Sensation stable. Motor: unchanged C5-T1 and L2-S1. Kyra's sign negative in bilateral lower extremities. Calves soft, nontender upon palpation and with passive twitch. Moves both upper and lower extremities. Incision: clean, dry, and intact. No significant erythema or swelling. No active drainage noted. Vital Signs:    Blood pressure 143/70, pulse 60, temperature 97.3 °F (36.3 °C), resp. rate 16, weight 85.9 kg (189 lb 6 oz), SpO2 93 %. Temp (24hrs), Av.9 °F (36.6 °C), Min:97.3 °F (36.3 °C), Max:98.6 °F (37 °C)      LAB:    No results for input(s): HCT, HGB, PLT, HCTEXT, HGBEXT, PLTEXT in the last 72 hours.   Lab Results   Component Value Date/Time    Sodium 139 2019 10:38 AM    Potassium 4.8 2019 10:38 AM    Chloride 103 2019 10:38 AM    CO2 28 2019 10:38 AM    Glucose 125 (H) 2019 10:38 AM    BUN 21 (H) 2019 10:38 AM    Creatinine 0.95 2019 10:38 AM    Calcium 9.8 2019 10:38 AM       Intake/Output: 4126 - 02/07 1900  In: -   Out: 300 [Urine:300]  02/05 1901 - 02/07 0700  In: 650 [I.V.:650]  Out: 5344 [Urine:1150; Drains:20]    PT/OT:   Gait:                    Assessment:   Patient is 1 Day Post-Op s/p Procedure(s):  REVISION ANTERIOR CERVICAL DISCECTOMY WITH FUSION C5-C7    Plan:     1. Continue PT/OT  2. Continue established methods of pain control  3. VTE Prophylaxes - TEDS &/or SCDs   4. Remove drain   5. Discharge home today   6.        Signed By: JUAN Narayanan

## 2019-02-07 NOTE — OP NOTES
[address]   OPERATIVE REPORT    Name:  Vandana Ann  MR#:  [id number]  :  1950  ACCOUNT #:  [de-identified]  DATE OF SERVICE:  2019      PREOPERATIVE DIAGNOSIS:  Cervical spondylosis with cervical  stenosis at C5-C6, status post C6-C7 fusion. POSTOPERATIVE DIAGNOSIS:  Cervical spondylosis with cervical  stenosis at C5-C6, status post C6-C7 fusion. PROCEDURE PERFORMED:  Exploration of fusion with an anterior  cervical diskectomy at C5-C6, anterior interbody fusion at  C5-C6, anterior revision cervical fusion at C5-C6 and C6-C7. SURGEON:  Derik Acevedo MD    ASSISTANT:  Spike Curran PA-C    ANESTHESIA:    COMPLICATIONS:  None. SPECIMENS REMOVED:    INSTRUMENTATIONS:  Include Medtronic Elite plate and  Medtronic PTC. ESTIMATED BLOOD LOSS:  Minimal.    INDICATIONS:  This is a 64-year-old female with history of  neck pain chronically, previous cervical fusion back in   with good relief, now with spondylosis at C5-C6 with neck  pain and bilateral arm symptoms. The patient is for  revision and fusion. PROCEDURE:  The patient was identified and brought to the  operative suite, underwent general anesthesia without  difficulty. Placed in the supine position, 10-pound of  traction applied to the head which was after prepping and  draping, time-out was performed. After time-out, we then  made a transverse incision on the right hand side dissecting  down to the platysma. Blunt dissection was made at the  sternocleidomastoid. We carefully dissected off the scar  tissue off the anterior cervical plate which was identified. We also identified the C5-C6 space with fluoroscopy. Longus  colli was elevated releasing retraction to plate. We then  did an annulotomy and then did complete the discectomy of  the C5-C6 disk space. It was nearly completely collapsed  with significant spondylosis.   At which time, we were able  to remove the remainder of disk material as well as  cartilaginous endplate, the likely bleeding bone. Then after  distracting with the [place] pin retractor we removed the anterior and posterior  osteophytes  with combination of #1 and #2 Kerrison's as well as a Midas mally  under loupe magnification. Bilateral foraminotomies were  performed with undercutting the uncinate process as well with #1 and #2 Kerrisons  for foraminal decompression. After which, we dissected the  foramen without difficulty. We then measured the space for  14 x 11, 7 mm high interbody graft, grasped the end plates. We packed the Medtronic PTC graft with Malgorzata allograft   stable. We packed the additional Malgorzata allograft around  the graft side. We then exposed the remainder of the prior  plate and removed it without difficulty. Explored the  fusion, which appeared to be intact. We then contoured a  42.5 mm Medtronic Elite plate to the anterior surface of the spine  threaded pin followed by 4 mm x 14 mm screws at C5, C6, and  C7 for good fixation. Wounds thoroughly irrigated. Following the locking mechanisms were engaged and final x-rays demonstrated  good fixation. We placed a Versa-Shield for anti-adhesion, and the  patient had #7 flat RAMAKRISHNA drain placed as well as 2-0 Vicryl on  subcutaneous area and a Zipline for the skin. The patient had a soft collar  placed. The patient was returned to the PACU in stable  condition.         Edenilson Fisher MD      [id number]  D:  02/06/2019 9:13  T:  02/06/2019 20:19  JOB #:  [id number]

## 2019-02-07 NOTE — ROUTINE PROCESS
I have reviewed discharge instructions with the patient. The patient verbalized understanding. IV discontinued.

## 2019-02-07 NOTE — PROGRESS NOTES
Consult received for home health. Chart reviewed, no PT and OT consults at this time. Patient will discharge home today with no needs.   Patricia MCCARTHY, ACM

## 2019-02-07 NOTE — DIABETES MGMT
DTC Progress Note    Recommendations/ Comments:Chart reviewed due to hyperglycemia. Noted Glipizide and Metformin ordered today. If appropriate, please consider changing diet to carb consistent. Current hospital DM medication: Glipizide 5 mg daily, Metformin 1000 mg BID and correction scale Humalog with normal sensitivity. Chart reviewed on Karthik Abrams. Patient is a 77 y.o. female with known diabetes on Glipizide 5 mg BID and Metformin 1000 mg BID at home. A1c:   Lab Results   Component Value Date/Time    Hemoglobin A1c 7.4 (H) 01/23/2019 10:38 AM       Recent Glucose Results:   Lab Results   Component Value Date/Time    GLUCPOC 192 (H) 02/07/2019 11:13 AM    GLUCPOC 193 (H) 02/07/2019 06:02 AM    GLUCPOC 195 (H) 02/06/2019 09:14 PM        Lab Results   Component Value Date/Time    Creatinine 0.95 01/23/2019 10:38 AM     Estimated Creatinine Clearance: 60.8 mL/min (based on SCr of 0.95 mg/dL). Active Orders   Diet    DIET REGULAR        PO intake: No data found. Will continue to follow as needed.     Thank you  Lien Wills RD, CDE          Time spent: 5 minutes

## 2019-02-07 NOTE — DISCHARGE SUMMARY
[address] [place] [address]   [address]  [phone]     Discharge Summary       PATIENT ID: Alba Gordon  MRN: [id number]   YOB: 1950    DATE OF ADMISSION: 2/6/2019  6:03 AM    DATE OF DISCHARGE: 2/7/2019   PRIMARY CARE PROVIDER: Jessika Vergara MD     CONSULTATIONS: None    PROCEDURES/SURGERIES: Procedure(s):  REVISION ANTERIOR CERVICAL DISCECTOMY WITH FUSION C5-C7    History of Present Illness:  Alba Gordon is a 77 y.o. female with a history of chronic neck pain with prior cervical fusion back in 2003. She has progressive pain and bilateral arm symptoms with C5-6 spondylosis above her prior fusion. After failing conservative therapy and a discussion of the risks, benefits, alternatives, perioperative course, and potential complications of surgery, she consented to undergo a Procedure(s):  REVISION ANTERIOR CERVICAL DISCECTOMY WITH FUSION C5-C7. Hospital Course:  Alba Gordon tolerated the procedure well. She was transferred  to the recovery room in stable condition. After a brief stay the patient was then transferred to the Spinal Surgery Unit at [address] [place] [address].  On postoperative day #1, the dressing was clean and dry, she was neurovascularly intact. The patient was afebrile and vital signs were stable. Calves were soft and non-tender bilaterally. The patient was tolerating a regular diet, voiding, and mobilizing without difficulty. She had no trouble swallowing. Alba Gordon was discharged to Home in stable condition on postoperative day 1. She was provided with routine postoperative instructions and advised to follow up with Erika Campoverde MD in 2 weeks following discharge from the hospital.  She was instructed to resume her Eliquis for history of chronic atrial fibrillation on 2/8/19.       FOLLOW UP APPOINTMENTS:    Follow-up Information     Follow up With Specialties Details Why Contact Info    Torie Eubanks MD [address]  [phone]               DISCHARGE MEDICATIONS:  Discharge Medication List as of 2/7/2019 12:47 PM      START taking these medications    Details   oxyCODONE IR (ROXICODONE) 5 mg immediate release tablet Take 1-2 Tabs by mouth every four (4) hours as needed for Pain. Max Daily Amount: 60 mg., Print, Disp-60 Tab, R-0      naloxone (NARCAN) 4 mg/actuation nasal spray Use 1 spray intranasally, then discard. Repeat with new spray every 2 min as needed for opioid overdose symptoms, alternating nostrils. , Print, Disp-2 Each, R-0         CONTINUE these medications which have NOT CHANGED    Details   allopurinol (ZYLOPRIM) 300 mg tablet Take 300 mg by mouth daily. , Historical Med      ANASTROZOLE PO Take 1 mg by mouth daily. , Historical Med      atorvastatin (LIPITOR) 20 mg tablet Take 20 mg by mouth daily. , Historical Med      nebivolol (BYSTOLIC) 10 mg tablet Take 10 mg by mouth daily. , Historical Med      digoxin (DIGITEK) 0.25 mg tablet Take 0.25 mg by mouth daily. TAKE Monday THROUGH Friday ONLY, Historical Med      dilTIAZem ER (CARDIZEM LA) 240 mg Tb24 tablet Take 240 mg by mouth daily. , Historical Med      DULoxetine (CYMBALTA) 60 mg capsule Take 60 mg by mouth daily. , Historical Med      metFORMIN (GLUCOPHAGE) 1,000 mg tablet Take 1,000 mg by mouth two (2) times daily (with meals). , Historical Med      sotalol (BETAPACE) 120 mg tablet Take 120 mg by mouth two (2) times a day., Historical Med      BABY ASPIRIN PO Take 81 mg by mouth daily. , Historical Med      apixaban (ELIQUIS) 5 mg tablet Take 5 mg by mouth two (2) times a day., Historical Med      glipiZIDE (GLUCOTROL) 5 mg tablet Take 5 mg by mouth two (2) times a day., Historical Med      omega-3 fatty acids/fish oil (FISH OIL EXTRA STRENGTH PO) Take 1 Tab by mouth daily. , Historical Med             CHRONIC MEDICAL DIAGNOSES:  Problem List as of 2/7/2019 Date Reviewed: 2/6/2019          Codes Class Noted - Resolved    Cervical stenosis of spine ICD-10-CM: M48.02  ICD-9-CM: 723.0  2/6/2019 - Present              Signed:   Trudy Hebert NP  2/7/2019  2:04 PM

## 2019-02-07 NOTE — DISCHARGE INSTRUCTIONS
After Hospital Care Plan:  Discharge Instructions Cervical (Neck) Spine Surgery Dr. Paulino León    Patient Name: Steffany Patel    Date of procedure: 2/6/2019  Date of discharge: 2/7/2019    Procedure: Procedure(s):  REVISION ANTERIOR CERVICAL DISCECTOMY WITH FUSION C5-C7  PCP: Quinton Sinha MD    Follow up appointments   -follow up with Dr. Paulino León in 2 weeks. Call [phone] to make an appointment as soon as you get home from the hospital.    When to call your Orthopaedic Surgeon:  -Difficulty swallowing that is worse than when you left the hospital.  -Signs of infection-if your incision is red; continues to have drainage; drainage has a foul odor or if you have a persistent fever over 101 degrees for 24 hours  -nausea or vomiting, severe headache  -changes in sensation in your arms or legs (numbness, tingling, loss of color)  -increased weakness-greater than before your surgery  -severe pain or pain not relieved by medications  -Signs of a blood clot in your leg-calf pain, tenderness, redness, swelling of lower leg    When to call your Primary Care Physician:  -Concerns about medical conditions such as diabetes, high blood pressure, asthma, congestive heart failure  -Call if blood sugars are elevated, persistent headache or dizziness, coughing or congestion, constipation or diarrhea, burning with urination, abnormal heart rate    When to call 911 and go to the nearest emergency room:  -acute onset of chest pain, shortness of breath, difficulty breathing    Activity  - You are going home a well person, be as active as possible. Your only exercise should be walking. Start with short frequent walks and increase your walking distance each day.  -Limit the amount of time you sit to 20-30 minute intervals. Sitting for prolonged periods of time will be uncomfortable for you following surgery.   - Do NOT lift anything over 5 pounds  -From now on, even when lifting light weight, bend with your knees and not your back.  -Do NOT do any neck exercises until you have been instructed by your doctor  -When you are in bed, you may lay on your back or on either side. Do NOT lie on your stomach    Cervical Collar (Aspen Collar)  -You are required to wear your cervical collar at all times; except when showering. You may remove the collar long enough to change the pads when needed and to change your dressing each day. -Do not bend or twist when your collar is off. It is best to have someone assist you when changing the pads and your dressing to prevent you from bending your neck. - Clean the pads on your neck collar every day by hand washing with a mild soap and water. Pat them dry with a towel and lay out to air dry. Do not use heat to dry the pads. Driving  -You may not drive or return to work until instructed by your physician. However, you may ride in the car for short periods of time. Incision Care  Your incision has been closed with absorbable sutures and the Zipline skin closure system. This will assist with healing. [facility] to [facility] your incision for 2 weeks. A dry dressing (ABD and tape) will be placed over it and should be changed daily, for at least the first several days after your surgery. If you have no incisional drainage, you may leave the incision open to air if you wish, still leaving the Zipline in place. Please make sure to wash your hands prior to touching your dressing. You may take brief showers but do not run the water directly onto the wound. After your shower, blot your incision dry with a soft towel and replace the dry dressing. Do not allow the tape to come in contact with the Zipline. Do not rub or apply any lotions or ointments to your incision site. Do not soak or scrub your wound. The Zipline dressing will be removed during your two week follow-up appointment.  If you experience drainage leaking from underneath the Zipline or if it peels off before 2 weeks, please contact your orthopedic surgeons office. Showering  -You may shower in approximately 2 days after your surgery.    -Leave the dressing on during your shower. Do NOT allow the water to run directly onto your dressing. Once you get out of the shower, put on a dry dressing.  -Reminder- Make sure you put clean pads on your collar after your shower.    -Do not take a tub bath. Preventing blood clots  -You have been given T.E.D. stockings to wear. Continue to wear these for 7 days after your discharge. Put them on in the morning and take them off at night.    -They are used to increase your circulation and prevent blood clots from forming in your legs  -T. E.D. stockings can be machine washed, temperature not to exceed 160° F (71°C) and machine dried for 15 to 20 minutes, temperature not to exceed 250° F (121°C). Pain management  -Take pain medication as prescribed; decrease the amount you use as your pain lessens  -Do not wait until you are in extreme pain to take your medication.  -Avoid alcoholic beverages while taking pain medication    Pain Medication Safety  DO:  -Read the Medication Guide   -Take your medicine exactly as prescribed   -Store your medicine away from children and in a safe place   -Flush unused medicine down the toilet   -Call your healthcare provider for medical advice about side effects. You may report side effects to FDA at 5-723-FDA-4141.   -Please be aware that many medications contain Tylenol. We do not want you to over medicate so please read the information below as a guide. Do not take more than 4 Grams of Tylenol in a 24 hour period.   (There are 1000 milligrams in one Gram)                                                                                                                                                                                                    Percocet contains 325 mg of Tylenol per tablet (do not take more than 12 tablets in 24 hours)  Lortab contains 500 mg of Tylenol per tablet (do not take more than 8 tablets in 24 hours)  Norco contains 325 mg of Tylenol per tablet (do not take more than 12 tablets in 24 hours). DO NOT:  -Do not give your medicine to others   -Do not take medicine unless it was prescribed for you   -Do not stop taking your medicine without talking to your healthcare provider   -Do not break, chew, crush, dissolve, or inject your medicine. If you cannot  swallow your medicine whole, talk to your healthcare provider.  -Do not drink alcohol while taking this medicine  -Do not take anti-inflammatory medications or aspirin unless instructed by your     Physician. Diet  -resume usual diet; drink plenty of fluids; eat foods high in fiber  -It is important to have regular bowel movements. Pain medications may cause constipation. You may want to take a stool softener (such as Senokot-S or Colace) to prevent constipation. If constipation occurs, take a laxative (such as Dulcolax tablets, Milk of Magnesia, or a suppository). Laxatives should only be used if the above preventable measures have failed and you still have not had a bowel movement after three days.

## 2019-08-19 ENCOUNTER — HOSPITAL ENCOUNTER (OUTPATIENT)
Dept: CT IMAGING | Age: 69
Discharge: HOME | End: 2019-08-19
Attending: ORTHOPAEDIC SURGERY
Payer: MEDICARE

## 2019-08-19 DIAGNOSIS — Z98.1 S/P SPINAL FUSION: ICD-10-CM

## 2019-08-19 DIAGNOSIS — M48.02 SPINAL STENOSIS IN CERVICAL REGION: ICD-10-CM

## 2019-08-19 PROCEDURE — 72125 CT NECK SPINE W/O DYE: CPT

## 2020-12-09 ENCOUNTER — TRANSCRIBE ORDER (OUTPATIENT)
Dept: SCHEDULING | Age: 70
End: 2020-12-09

## 2020-12-09 DIAGNOSIS — M50.30 DDD (DEGENERATIVE DISC DISEASE), CERVICAL: ICD-10-CM

## 2020-12-09 DIAGNOSIS — Z98.1 S/P CERVICAL SPINAL FUSION: Primary | ICD-10-CM

## 2020-12-09 DIAGNOSIS — M47.812 NECK ARTHRITIS: ICD-10-CM

## 2020-12-21 ENCOUNTER — HOSPITAL ENCOUNTER (OUTPATIENT)
Dept: CT IMAGING | Age: 70
Discharge: HOME | End: 2020-12-21
Attending: PHYSICIAN ASSISTANT
Payer: MEDICARE

## 2020-12-21 DIAGNOSIS — M50.30 DDD (DEGENERATIVE DISC DISEASE), CERVICAL: ICD-10-CM

## 2020-12-21 DIAGNOSIS — Z98.1 S/P CERVICAL SPINAL FUSION: ICD-10-CM

## 2020-12-21 DIAGNOSIS — M47.812 NECK ARTHRITIS: ICD-10-CM

## 2020-12-21 PROCEDURE — 72125 CT NECK SPINE W/O DYE: CPT

## 2021-07-26 ENCOUNTER — TRANSCRIBE ORDER (OUTPATIENT)
Dept: SCHEDULING | Age: 71
End: 2021-07-26

## 2021-07-26 DIAGNOSIS — M19.019 OSTEOARTHRITIS OF SHOULDER: ICD-10-CM

## 2021-07-26 DIAGNOSIS — M25.511 PAIN OF RIGHT SHOULDER REGION: Primary | ICD-10-CM

## 2021-07-27 ENCOUNTER — HOSPITAL ENCOUNTER (OUTPATIENT)
Dept: CT IMAGING | Age: 71
Discharge: HOME | End: 2021-07-27
Attending: ORTHOPAEDIC SURGERY
Payer: MEDICARE

## 2021-07-27 DIAGNOSIS — M19.019 OSTEOARTHRITIS OF SHOULDER: ICD-10-CM

## 2021-07-27 DIAGNOSIS — M25.511 PAIN OF RIGHT SHOULDER REGION: ICD-10-CM

## 2021-07-27 PROCEDURE — 73200 CT UPPER EXTREMITY W/O DYE: CPT

## 2022-03-14 ENCOUNTER — OFFICE VISIT (OUTPATIENT)
Dept: ORTHOPEDIC SURGERY | Age: 72
End: 2022-03-14
Payer: MEDICARE

## 2022-03-14 VITALS — HEIGHT: 66 IN | BODY MASS INDEX: 30.37 KG/M2 | WEIGHT: 189 LBS

## 2022-03-14 DIAGNOSIS — M19.011 PRIMARY OSTEOARTHRITIS, RIGHT SHOULDER: Primary | ICD-10-CM

## 2022-03-14 PROCEDURE — 3017F COLORECTAL CA SCREEN DOC REV: CPT | Performed by: ORTHOPAEDIC SURGERY

## 2022-03-14 PROCEDURE — 99214 OFFICE O/P EST MOD 30 MIN: CPT | Performed by: ORTHOPAEDIC SURGERY

## 2022-03-14 PROCEDURE — 1101F PT FALLS ASSESS-DOCD LE1/YR: CPT | Performed by: ORTHOPAEDIC SURGERY

## 2022-03-14 PROCEDURE — G9708 BILAT MAST/HX BI /UNILAT MAS: HCPCS | Performed by: ORTHOPAEDIC SURGERY

## 2022-03-14 PROCEDURE — 1090F PRES/ABSN URINE INCON ASSESS: CPT | Performed by: ORTHOPAEDIC SURGERY

## 2022-03-14 PROCEDURE — G8432 DEP SCR NOT DOC, RNG: HCPCS | Performed by: ORTHOPAEDIC SURGERY

## 2022-03-14 PROCEDURE — G8417 CALC BMI ABV UP PARAM F/U: HCPCS | Performed by: ORTHOPAEDIC SURGERY

## 2022-03-14 PROCEDURE — G8536 NO DOC ELDER MAL SCRN: HCPCS | Performed by: ORTHOPAEDIC SURGERY

## 2022-03-14 PROCEDURE — G8427 DOCREV CUR MEDS BY ELIG CLIN: HCPCS | Performed by: ORTHOPAEDIC SURGERY

## 2022-03-14 PROCEDURE — G8400 PT W/DXA NO RESULTS DOC: HCPCS | Performed by: ORTHOPAEDIC SURGERY

## 2022-03-14 RX ORDER — OMEPRAZOLE 40 MG/1
40 CAPSULE, DELAYED RELEASE ORAL DAILY
COMMUNITY
Start: 2022-02-24

## 2022-03-14 RX ORDER — TRIAMTERENE/HYDROCHLOROTHIAZID 37.5-25 MG
1 TABLET ORAL DAILY
COMMUNITY
Start: 2022-03-03

## 2022-03-14 RX ORDER — ESCITALOPRAM OXALATE 10 MG/1
10 TABLET ORAL DAILY
COMMUNITY
Start: 2022-02-07

## 2022-03-14 RX ORDER — MUPIROCIN 20 MG/G
OINTMENT TOPICAL
COMMUNITY
Start: 2022-03-04 | End: 2022-04-05

## 2022-03-14 RX ORDER — LANCETS
EACH MISCELLANEOUS
COMMUNITY
Start: 2021-12-26

## 2022-03-14 RX ORDER — CEPHALEXIN 500 MG/1
CAPSULE ORAL
COMMUNITY
Start: 2022-03-02 | End: 2022-04-05

## 2022-03-14 RX ORDER — METFORMIN HYDROCHLORIDE 500 MG/1
500 TABLET, EXTENDED RELEASE ORAL
COMMUNITY
Start: 2021-12-30

## 2022-03-14 RX ORDER — TRIAMCINOLONE ACETONIDE 1 MG/G
CREAM TOPICAL
COMMUNITY
Start: 2022-03-04 | End: 2022-04-05

## 2022-03-14 RX ORDER — DOXYCYCLINE 100 MG/1
CAPSULE ORAL
COMMUNITY
Start: 2022-03-04 | End: 2022-04-05

## 2022-03-14 NOTE — PROGRESS NOTES
Pallavi Anders (: 1950) is a 66 y.o. female, established patient, here for evaluation of the following chief complaint(s):  Shoulder Pain (right shoulder)       ASSESSMENT/PLAN:  Below is the assessment and plan developed based on review of pertinent history, physical exam, labs, studies, and medications. We had a long discussion regarding further treatment options for her right shoulder. We have discussed this in the past as well. We will plan for right total shoulder arthroplasty. However, if we have any concerns for rotator cuff while we are intraoperative and we will transition to a reverse total shoulder arthroplasty. Risks and benefits of surgical treatment were explained. We discussed risks of infection, blood loss, neurovascular injury, anesthesia risks, and risk secondary to patient comorbidities. Risk of continued shoulder pain/instability was explained. We discussed that there may be need for future surgical procedures. We discussed that implants may need to be used for this procedure. Patient understands the risks of this procedure and elects to schedule in the near future. 1. Primary osteoarthritis, right shoulder      Return for for surgery. SUBJECTIVE/OBJECTIVE:  Pallavi Anders (: 1950) is a 66 y.o. female. She notes continued aching pain and clicking in the right shoulder. Pain occurs on a daily basis. She had to wait a few months for her cardiac ablation surgery but now is doing well and is cleared.         No Known Allergies    Current Outpatient Medications   Medication Sig    cephALEXin (KEFLEX) 500 mg capsule     doxycycline (VIBRAMYCIN) 100 mg capsule     escitalopram oxalate (LEXAPRO) 10 mg tablet     Accu-Chek Fastclix Lancet Drum misc     mupirocin (BACTROBAN) 2 % ointment     omeprazole (PRILOSEC) 40 mg capsule     triamcinolone acetonide (KENALOG) 0.1 % topical cream     triamterene-hydroCHLOROthiazide (MAXZIDE) 37.5-25 mg per tablet     metFORMIN ER (GLUCOPHAGE XR) 500 mg tablet     allopurinol (ZYLOPRIM) 300 mg tablet Take 300 mg by mouth daily.  ANASTROZOLE PO Take 1 mg by mouth daily.  atorvastatin (LIPITOR) 20 mg tablet Take 20 mg by mouth daily.  nebivolol (BYSTOLIC) 10 mg tablet Take 10 mg by mouth daily.  dilTIAZem ER (CARDIZEM LA) 240 mg Tb24 tablet Take 240 mg by mouth daily.  DULoxetine (CYMBALTA) 60 mg capsule Take 60 mg by mouth daily.  apixaban (ELIQUIS) 5 mg tablet Take 5 mg by mouth two (2) times a day.  glipiZIDE (GLUCOTROL) 5 mg tablet Take 5 mg by mouth two (2) times a day.  omega-3 fatty acids/fish oil (FISH OIL EXTRA STRENGTH PO) Take 1 Tab by mouth daily.  sotalol (BETAPACE) 120 mg tablet Take 120 mg by mouth two (2) times a day. No current facility-administered medications for this visit. Social History     Socioeconomic History    Marital status:      Spouse name: Not on file    Number of children: Not on file    Years of education: Not on file    Highest education level: Not on file   Occupational History    Not on file   Tobacco Use    Smoking status: Never Smoker    Smokeless tobacco: Never Used   Substance and Sexual Activity    Alcohol use: No    Drug use: No    Sexual activity: Not on file   Other Topics Concern    Not on file   Social History Narrative    Not on file     Social Determinants of Health     Financial Resource Strain:     Difficulty of Paying Living Expenses: Not on file   Food Insecurity:     Worried About Running Out of Food in the Last Year: Not on file    Jaison of Food in the Last Year: Not on file   Transportation Needs:     Lack of Transportation (Medical): Not on file    Lack of Transportation (Non-Medical):  Not on file   Physical Activity:     Days of Exercise per Week: Not on file    Minutes of Exercise per Session: Not on file   Stress:     Feeling of Stress : Not on file   Social Connections:     Frequency of Communication with Friends and Family: Not on file    Frequency of Social Gatherings with Friends and Family: Not on file    Attends Catholic Services: Not on file    Active Member of Clubs or Organizations: Not on file    Attends Club or Organization Meetings: Not on file    Marital Status: Not on file   Intimate Partner Violence:     Fear of Current or Ex-Partner: Not on file    Emotionally Abused: Not on file    Physically Abused: Not on file    Sexually Abused: Not on file   Housing Stability:     Unable to Pay for Housing in the Last Year: Not on file    Number of [place] in the Last Year: Not on file    Unstable Housing in the Last Year: Not on file       Past Surgical History:   Procedure Laterality Date    HX BREAST RECONSTRUCTION Bilateral 2017    HX COLONOSCOPY      HX ENDOSCOPY      HX HEENT      WISDOM TEETH X4    HX HERNIA REPAIR  2018    HX HYSTERECTOMY  1980s    HX MASTECTOMY Bilateral 2016    HX ORTHOPAEDIC  1980s    HAD TWO LUMBAR SURGERIES - PT UNSURE OF PROCEDURE    HX ORTHOPAEDIC  2003    CERVICAL PLATE INSERTED    HX ORTHOPAEDIC  2018    LEFT ANKLE TENDON REPAIR    HX OTHER SURGICAL  11/2021    Ablation of the heart     HX TONSILLECTOMY  CHILDHOOD    HX TUBAL LIGATION         Family History   Problem Relation Age of Onset    Stroke Mother     Diabetes Mother     Heart Disease Father     Stroke Father     Atrial Fibrillation Brother     Anesth Problems Neg Hx         OB History    No obstetric history on file. REVIEW OF SYSTEMS:    Patient denies any recent fever, chills, nausea, vomiting, chest pain, or shortness of breath. Vitals:  Ht 5' 6\" (1.676 m)   Wt 189 lb (85.7 kg)   BMI 30.51 kg/m²    Body mass index is 30.51 kg/m². PHYSICAL EXAM:  General exam: Patient is awake, alert, and oriented x3. Well-appearing. No acute distress. Ambulates with a normal gait. Right shoulder: Neurovascular and sensory intact.   There is decreased range of motion in all planes on active and passive exam.  There is crepitus with range of motion of the shoulder. There is pain with impingement testing including Roberto exam.  There is mild weakness noted with resisted abduction and resisted external rotation on exam.  Normal stability is noted. IMAGING:  CT Results (most recent):  Results from Hospital Encounter encounter on 07/27/21    CT UP EXT RT WO CONT    Narrative  EXAM: CT UP EXT RT WO CONT    INDICATION: Right shoulder pain secondary to osteoarthritis. COMPARISON: None    TECHNIQUE: Helical CT of the right shoulder with oblique coronal and oblique  sagittal reformats. Images reviewed in soft tissue and bone windows. CT dose  reduction was achieved through the use of a standardized protocol tailored for  this examination and automatic exposure control for dose modulation. CONTRAST: None. FINDINGS: Glenohumeral joint: Heterogeneous moderate-severe osteoarthritis. Small marginal osteophytes. Multiple glenoid subchondral cysts. The largest  subchondral cyst measures 9 mm. Glenoid bone depth in the transverse plane: 2.6 cm. 15 degrees retroversion. Bones: Normal bone mineralization. No fracture, dislocation, or periosteal  reaction. Joint fluid: Small glenohumeral joint effusion. Rotator cuff tendons: No massive cuff tear identified given the limitations of  CT. Acromioclavicular joint: Partial resection versus resorption of the distal  clavicle. Acromion process is post acromioplasty versus congenital volume loss. Muscles: Mild diffuse atrophy. Other: No soft tissue mass. Bilateral breast prostheses are partially imaged. Bilateral lungs are partially imaged and contain patchy atelectasis. Impression  1. Moderate-severe glenohumeral osteoarthritis. 2. 15 degrees glenoid retroversion. Glenoid contains multiple subcentimeter  subchondral cysts. 3. No fracture.       XR Results (most recent):  [place] [facility] [place]    XR SPINE CERV PA LAT ODONT 3 V MAX    Narrative  Compliance only    Indication: Spine fusion. Spot fluoroscopic AP and lateral views of the cervical spine demonstrate  anterior cervical fusion in progress from C5-C7. Impression  IMPRESSION: Anterior cervical fusion in progress. XR FLUOROSCOPY UNDER 60 MINUTES    Narrative  Fluoroscopy time was provided. Impression  IMPRESSION:  Fluoroscopy time was provided. Fluoro dose:  0.25 mGy      vk      Results from [place] encounter on 01/11/10    XR CHEST PA AND LATERAL    Narrative        ICD Codes / Adm. Diagnosis:    /   SPINAL STENOSIS  Examination:  CHEST PA AND LATERAL  - [id number] - Jan 11 2010  5:40PM  Accession No:  [id number]  Reason:  HYPERTENSION      REPORT:  Two-view chest    INDICATION: Hypertension    COMPARISON: None. FINDINGS: PA and lateral radiographs of the chest demonstrate clear lungs. The cardiac and mediastinal contours and pulmonary vascularity are normal.  The bones and soft tissues are within normal limits. IMPRESSION: Normal chest.          Interpreting/Reading Doctor: Ollie Castelan ([id number])  Transcribed: n/a on 01/11/2010  Approved: Ollie Castelan ([id number])  01/11/2010        Distribution:  Attending Doctor: Manpreet Zelaya  Alternate Doctor: Manpreet Zelaya         No orders of the defined types were placed in this encounter. An electronic signature was used to authenticate this note.   -- Eulalio Kenyon DO

## 2022-03-18 PROBLEM — M48.02 CERVICAL STENOSIS OF SPINE: Status: ACTIVE | Noted: 2019-02-06

## 2022-04-05 ENCOUNTER — HOSPITAL ENCOUNTER (OUTPATIENT)
Dept: PREADMISSION TESTING | Age: 72
Discharge: HOME | End: 2022-04-05
Payer: MEDICARE

## 2022-04-05 VITALS
HEART RATE: 69 BPM | BODY MASS INDEX: 34.19 KG/M2 | DIASTOLIC BLOOD PRESSURE: 63 MMHG | TEMPERATURE: 98.2 F | SYSTOLIC BLOOD PRESSURE: 115 MMHG | RESPIRATION RATE: 16 BRPM | HEIGHT: 66 IN | WEIGHT: 212.74 LBS | OXYGEN SATURATION: 99 %

## 2022-04-05 LAB
ABO + RH BLD: NORMAL
ALBUMIN SERPL-MCNC: 3.7 G/DL (ref 3.5–5)
ALBUMIN/GLOB SERPL: 1 {RATIO} (ref 1.1–2.2)
ALP SERPL-CCNC: 108 U/L (ref 45–117)
ALT SERPL-CCNC: 28 U/L (ref 12–78)
ANION GAP SERPL CALC-SCNC: 2 MMOL/L (ref 5–15)
APPEARANCE UR: CLEAR
AST SERPL-CCNC: 24 U/L (ref 15–37)
BACTERIA URNS QL MICRO: NEGATIVE /HPF
BILIRUB SERPL-MCNC: 0.3 MG/DL (ref 0.2–1)
BILIRUB UR QL: NEGATIVE
BLOOD GROUP ANTIBODIES SERPL: NORMAL
BUN SERPL-MCNC: 22 MG/DL (ref 6–20)
BUN/CREAT SERPL: 22 (ref 12–20)
CALCIUM SERPL-MCNC: 9.3 MG/DL (ref 8.5–10.1)
CHLORIDE SERPL-SCNC: 104 MMOL/L (ref 97–108)
CO2 SERPL-SCNC: 32 MMOL/L (ref 21–32)
COLOR UR: NORMAL
CREAT SERPL-MCNC: 0.99 MG/DL (ref 0.55–1.02)
EPITH CASTS URNS QL MICRO: NORMAL /LPF
ERYTHROCYTE [DISTWIDTH] IN BLOOD BY AUTOMATED COUNT: 14.3 % (ref 11.5–14.5)
EST. AVERAGE GLUCOSE BLD GHB EST-MCNC: 134 MG/DL
GLOBULIN SER CALC-MCNC: 3.8 G/DL (ref 2–4)
GLUCOSE SERPL-MCNC: 118 MG/DL (ref 65–100)
GLUCOSE UR STRIP.AUTO-MCNC: NEGATIVE MG/DL
HBA1C MFR BLD: 6.3 % (ref 4–5.6)
HCT VFR BLD AUTO: 44.6 % (ref 35–47)
HGB BLD-MCNC: 14.7 G/DL (ref 11.5–16)
HGB UR QL STRIP: NEGATIVE
HYALINE CASTS URNS QL MICRO: NORMAL /LPF (ref 0–5)
INR PPP: 1 (ref 0.9–1.1)
KETONES UR QL STRIP.AUTO: NEGATIVE MG/DL
LEUKOCYTE ESTERASE UR QL STRIP.AUTO: NEGATIVE
MCH RBC QN AUTO: 30.3 PG (ref 26–34)
MCHC RBC AUTO-ENTMCNC: 33 G/DL (ref 30–36.5)
MCV RBC AUTO: 92 FL (ref 80–99)
NITRITE UR QL STRIP.AUTO: NEGATIVE
NRBC # BLD: 0 K/UL (ref 0–0.01)
NRBC BLD-RTO: 0 PER 100 WBC
PH UR STRIP: 5.5 [PH] (ref 5–8)
PLATELET # BLD AUTO: 304 K/UL (ref 150–400)
PMV BLD AUTO: 9.2 FL (ref 8.9–12.9)
POTASSIUM SERPL-SCNC: 4.7 MMOL/L (ref 3.5–5.1)
PROT SERPL-MCNC: 7.5 G/DL (ref 6.4–8.2)
PROT UR STRIP-MCNC: NEGATIVE MG/DL
PROTHROMBIN TIME: 10.5 SEC (ref 9–11.1)
RBC # BLD AUTO: 4.85 M/UL (ref 3.8–5.2)
RBC #/AREA URNS HPF: NORMAL /HPF (ref 0–5)
SODIUM SERPL-SCNC: 138 MMOL/L (ref 136–145)
SP GR UR REFRACTOMETRY: 1.01 (ref 1–1.03)
SPECIMEN EXP DATE BLD: NORMAL
UA: UC IF INDICATED,UAUC: NORMAL
UROBILINOGEN UR QL STRIP.AUTO: 0.2 EU/DL (ref 0.2–1)
WBC # BLD AUTO: 9.5 K/UL (ref 3.6–11)
WBC URNS QL MICRO: NORMAL /HPF (ref 0–4)

## 2022-04-05 PROCEDURE — 36415 COLL VENOUS BLD VENIPUNCTURE: CPT

## 2022-04-05 PROCEDURE — 97535 SELF CARE MNGMENT TRAINING: CPT | Performed by: OCCUPATIONAL THERAPIST

## 2022-04-05 PROCEDURE — 81001 URINALYSIS AUTO W/SCOPE: CPT

## 2022-04-05 PROCEDURE — 85027 COMPLETE CBC AUTOMATED: CPT

## 2022-04-05 PROCEDURE — 86900 BLOOD TYPING SEROLOGIC ABO: CPT

## 2022-04-05 PROCEDURE — 80053 COMPREHEN METABOLIC PANEL: CPT

## 2022-04-05 PROCEDURE — 85610 PROTHROMBIN TIME: CPT

## 2022-04-05 PROCEDURE — 83036 HEMOGLOBIN GLYCOSYLATED A1C: CPT

## 2022-04-05 PROCEDURE — 97165 OT EVAL LOW COMPLEX 30 MIN: CPT | Performed by: OCCUPATIONAL THERAPIST

## 2022-04-05 NOTE — PERIOP NOTES
[facility]  Ambulatory Surgery Unit  Joint/Spine Preoperative Instructions    Surgery Date 4/13/2022          Tentative Arrival Time TBD  Contact # [phone]    5. On the day of your surgery, please report to the Ambulatory Surgery Unit Registration Desk and sign in at your designated time. The Ambulatory Surgery Unit is located in [facility] on the [facility] side of the [facility], across from the [address] [place] [address]. Pleases have all of your insurance cards, photo ID and copay with you the morning of surgery. 2. You must have someone with you to drive you home. You should not drive a car for 24 hours following surgery. Please make arrangements for a friend or family member to stay with you at least the first 24 hours after your surgery. 3. No food after midnight 4/12/2022. This includes gum, candy, or mints. Medications morning of surgery should be taken with a sip of water. Please follow pre-surgery drink instructions that were given at your Pre Admission Testing appointment. 4. We recommend you do not drink any alcoholic beverages for 24 hours before and after your surgery. 5. Contact your surgeons office for instructions on the following medications: non-steroidal anti-inflammatory drugs (i.e. Advil, Aleve), vitamins, and supplements. (Some surgeons will want you to stop these medications prior to surgery and others may allow you to take them)  **If you are currently taking Plavix, Coumadin, Aspirin and/or other blood-thinning agents, contact your surgeon for instructions. ** Your surgeon will partner with the physician prescribing these medications to determine if it is safe to stop or if you need to continue taking. Please do not stop taking these medications without instructions from your surgeon    6. Wear comfortable clothes. Wear glasses instead of contacts. Do not bring any money or jewelry.  Please bring picture ID, insurance card, and any prearranged co-payment or hospital payment. Do not wear make-up, particularly mascara the morning of your surgery. Do not wear nail polish, particularly if you are having foot /hand surgery. Wear your hair loose or down, no ponytails, buns, ajrad pins or clips. All body piercings must be removed. Please do not shave for 48 hours prior to surgery. Shaving of the face is acceptable. Please see the attached Hibiclens bathing instructions. 7. You should understand that if you do not follow these instructions your surgery may be cancelled. If your physical condition changes (I.e. fever, cold or flu) please contact your surgeon as soon as possible. 8. It is important that you be on time. If a situation occurs where you may be late, please call [phone].    9. If you have any questions and or problems, please call [phone].    10. Your surgery time may be subject to change. You will receive a phone call the afternoon before surgery with your updated arrival time. 11.  If having outpatient surgery, you must have someone to drive you here, stay with you during the duration of your stay, and to drive you home at time of discharge. 12. The following link is for the educational video for patients and/or families. http://[employer] [place] com/locations/xosrqtkqo-ferqfxp-qjiokdh/[place]/educational-materials    Special Instructions:   Hold your Eliquis per your doctors instructions. Hold Fish Oil x7 days prior to surgery per Dr. Richelle Darby. Insulin Dependent Diabetic Patients: Take your diabetic medications as prescribed the day before surgery. Hold all diabetic medications the morning of surgery. If you are scheduled to arrive for surgery after 8 am and your AM blood sugar is >200, please call the Ambulatory Surgery Unit.     TAKE ALL MEDICATIONS THE DAY OF SURGERY EXCEPT:  Glipizide, Metformin, Vitamins/Supplements      I understand a pre-operative phone call will be made to verify my surgery time. In the event that I am not available, I give permission for a message to be left on my answering service and/or with another person?   yes         ___________________        __________   4/5/2022 @ 6248    (Signature of Patient)             (Witness)                (Date and Time)

## 2022-04-05 NOTE — PROGRESS NOTES
[facility]  Occupational Therapy Pre-surgery evaluation  [address]  [place], [address] [facility]    OCCUPATIONAL THERAPY PRE TOTAL SHOULDER SURGERY EVALUATION  Patient:Ying Riddle [de-identified]66 y.o. female)  Date: 4/5/2022    PAT  Procedure(s) (LRB):  RIGHT SHOULDER TOTAL SHOULDER ARTHROPLASTY VERSUS REVERSE TOTAL SHOULDER ARTHROPLASTY (Right)     Precautions: standard,          ASSESSMENT :  Based on the objective data described below, the patient presents with mildly impaired RUE AROM, intermittent  impaired sensation and pain,  due to end stage degenerative joint disease in the Right  shoulder. Pt has a history of 2 neck surgeries and 2 lumbar surgeries. She recently had a cardiac ablation procedure (11/2021)  due to afib. Pt reports that she has been having trouble with her knees and sometimes feels dizzy when bending forward, which may impact on pt's ability to perform the pendulum exercises. Pt's / was present this date and he is able to assist her prn. Pt feels that she may need to spend the night after her planned TSA surgery on 4/13, based on her tolerance of previous surgeries. Pt and  verbalized understanding of all education this date. Discussed anticipated disposition to home with possible discharge within a 1 - 2 day time frame post-surgery. Patient and  in agreement. Anticipate patient will not need acute PT and OT orders based on possible  deficits post surgery. However, pt feels that her tolerance for surgery is such that she may need to spend the night. GOALS: (Goals have been discussed and agreed upon with patient.)  DISCHARGE GOALS: Time Frame: 1 DAY  1.  Patient will demonstrate and/or verbalize full understanding of instructions related compensatory UB ADL techniques, sling management, shoulder/UE positioning for pain control, post operative shoulder exercises and functional mobility in order to minimize functional deficits in preparation for their upcoming surgery. This will be achieved by using education, demonstration and through the use of an informational handout including a home exercise program.  REHABILITATION POTENTIAL FOR STATED GOALS: Good     RECOMMENDATIONS AND PLANNED INTERVENTIONS: (Benefits and precautions of occupational therapy have been discussed with the patient.)    TREATMENT PLAN EFFECTIVE DATES: 4/5/2022 to 4/5/2022        SUBJECTIVE:   Patient stated I'll try that.    (Educated on positioning RUE for comfort at rest and during [address])    OBJECTIVE DATA SUMMARY:   HISTORY:      Past Medical History:   Diagnosis Date    Arrhythmia 2016    AFIB - CARDIOVERSION TO CORRECT    Arthritis     Cancer ([facility] [address] 2015    BILATERAL BREAST CANCER    Diabetes ([facility] [address]     DM2    Endocarditis 2014    HOSPITALIZED    Hypertension     Ill-defined condition     KIDNEY STONES    PONV (postoperative nausea and vomiting)     Psychiatric disorder     DEPRESSION    Sleep apnea     no cpap     Past Surgical History:   Procedure Laterality Date    HX BREAST RECONSTRUCTION Bilateral 2017    HX COLONOSCOPY      HX CYST REMOVAL  1974    HX ENDOSCOPY      HX HEENT      WISDOM TEETH X4    HX HERNIA REPAIR  2018    HX HYSTERECTOMY  1980s    HX MASTECTOMY Bilateral 2016    HX ORTHOPAEDIC  1980s    HAD TWO LUMBAR SURGERIES - PT UNSURE OF PROCEDURE    HX ORTHOPAEDIC  2003    CERVICAL PLATE INSERTED    HX ORTHOPAEDIC  2018    LEFT ANKLE TENDON REPAIR    HX OTHER SURGICAL  11/2021    Ablation of the heart     HX TONSILLECTOMY  CHILDHOOD    HX TUBAL LIGATION         Prior Level of Function/Home Situation: Pt llives with her  and reports that she is independent in adls and mobility.  was present and is able to assist pt as needed. Personal factors and/or comorbidities impacting plan of care: multiple orthopedic surgeries, hx Bkr CA Bilateral mastectomies.         Home Situation  Home Environment: Private residence  # Steps to Enter: 2  One/Two Story Residence: One story  Living Alone: No  Support Systems: Spouse/Significant Other,Child(sulma)  Patient Expects to be Discharged to[de-identified] Home with family assistance  Current DME Used/Available at Home: Cane, straight,Commode, bedside,Glucometer,Grab bars,Shower chair (reacher)  Tub or Shower Type: Shower          EXAMINATION/PRESENTATION/DECISION MAKING:     ADLs (Current Functional Status):    Bathing/Showering:   [x] Independent  [] Requires Assistance from Someone for upper body  [] Requires Assistance from Someone for lower body  [] Sponge Bath Only   Ambulation:  [x] Independent  [] Use Assistive Device  [] Use Wheelchair Only     Dressing:  [x] [address] from Someone for:  [] Shirts  [] Bra  [] Clothing fasteners  [] Sock/Shoes  [] Pants  [] Everything   Household Activities:  [] Routine house and yard work  [x] Light Housework Only  [] None       Critical Behavior:  Neurologic State: Alert,Appropriate for age  Orientation Level: Appropriate for age  Cognition: Appropriate decision making  Safety/Judgement: Awareness of environment,Fall prevention,Home safety,Insight into deficits    Strength:      decreased RUE--has pain, numbness,                       Tone & Sensation:    tone is normal  RUE sensation is impaired, decreased tolerance for activity                                 Range Of Motion:  BUEs: generally within functional limits                                Coordination:    fine motor intact    Gross motor: within functional limits  Decreased endurance for RUE    Functional Mobility:  Transfers:  Sit to Stand: Independent  Stand to Sit: Independent                       Balance:   Sitting: Intact  Standing: Intact (able to stand and lean forward for pendulums, LUE supported on counter--no dizziness)  ADL Assessment:  Feeding: Independent    Oral Facial Hygiene/Grooming: Independent    Bathing: Independent    Upper Body Dressing: Independent    Lower Body Dressing: Independent    Toileting: Independent                Functional Measure:  Barthel Index:    Barthel Index:  Bathin  Bladder: 10  Bowels: 10  Groomin  Dressing: 10  Feeding: 10  Mobility: 15  Stairs: 10  Toilet Use: 10  Transfer (Bed to Chair and Back): 15  Total: 100/100      The Barthel ADL Index: Guidelines  1. The index should be used as a record of what a patient does, not as a record of what a patient could do. 2. The main aim is to establish degree of independence from any help, physical or verbal, however minor and for whatever reason. 3. The need for supervision renders the patient not independent. 4. A patient's performance should be established using the best available evidence. Asking the patient, friends/relatives and nurses are the usual sources, but direct observation and common sense are also important. However direct testing is not needed. 5. Usually the patient's performance over the preceding 24-48 hours is important, but occasionally longer periods will be relevant. 6. Middle categories imply that the patient supplies over 50 per cent of the effort. 7. Use of aids to be independent is allowed. Score Interpretation (from [address] [address])    Independent   60-79 Minimally independent   40-59 Partially dependent   20-39 Very dependent   <20 Totally dependent     -Nikunj Garces., Barthel, D.W. (1965). Functional evaluation: the Barthel Index. [address] [facility] ([address] (1997). The Barthel activities of daily living index: self-reporting versus actual performance in the old (> or = 75 years). Journal of [address] [place] [address] 45(7), [facility], JSinaJ.., Bronson Bell., Ayana Lugog. (1999). Measuring the change in disability after inpatient rehabilitation; comparison of the responsiveness of the Barthel Index and Functional [place] Measure. Journal of Neurology, Neurosurgery, and Psychiatry, 66(4), 554-571.   Silviano Hogue RADHA Hinds, Cricket Bauman M.A. (2004) Assessment of post-stroke quality of life in cost-effectiveness studies: The usefulness of the Barthel Index and the EuroQoL-5D. Quality of Life Research, 13, 290-43         Pain:  Pain Scale 1: Numeric (0 - 10)  Pain Intensity 1: 0                Activity Tolerance:   Functional/good   Patient []   does  [x]   does not demonstrate signs/symptoms of shortness of breath/dyspnea on exertion/respiratory distress. COMMUNICATION/EDUCATION:   The patient was educated on:  [x]     Early post operative mobility is imperative to achieve a patient's desired outcomes and to restore biological function. [x]     Post operative Total Shoulder Arthroplasty precautions. These precautions are based on the patient's physician and the procedure(s) performed. [x]     Patient instructed on the importance of practicing management of ADLs using the non-operative UE only, prior to surgery to prepare for post op  recovery. [x]     Patient instructed on the importance of practicing bed mobility, transfers and ambulating with operative UE in sling. [x]     Reviewed sling donning/doffing procedure. [x]     Provided instruction on use of compensatory upper body dressing techniques and issued handout. [x]     Patient instructed on proper positioning of operative shoulder/UE in bed/chair, in order minimize pain and provide adequate support. [x]     Provided instruction on performance of pendulum exercises, and active assisted shoulder flexion. Total Shoulder Arthroplasty Precautions:  [x]    No pushing, pulling or weight bearing with operative upper extremity, and no external rotation of operative shoulder beyond neutral.  [x]    No AROM of operative shoulder, only pendulum exercises and passive/active assisted shoulder flexion of operative shoulder permitted until cleared by surgeon to progress activity.   [x]    AROM of elbow, hand and wrist on operative extremity permitted. [x]    Sling may be removed to perform pendulum exercises, active assisted/passive shoulder elevation, and upper body ADLs. [x]    Wear Sling when sleeping, but sling can be off when sitting up in chair. Pt is concerned re: stress on her neck from sling (has had 2 neck surgeries). Discussed using supportive pillow when at rest instead of fully using sling for support. Educated to wear sling for sleep      The patients plan of care was discussed as follows:   [x]         The patient verbalized understanding of his/her plan in preparation for their upcoming surgery  [x]         The patient's  was present for this session  []         The patient reports that he/she does not have a  identified at this time  [x]         The  verbalized understanding of the education regarding the patient's upcoming surgery  [x]         Patient/family agree to work toward stated goals and plan of care. []         Patient understands intent and goals of therapy, but is neutral about his/her participation. []         Patient is unable to participate in goal setting and plan of care.       Thank you for this referral.  Luz Marina Corbin, OTR/L   30 minutes

## 2022-04-05 NOTE — PERIOP NOTES
Hibiclens/Chlorhexidine    Preventing Infections Before and After  Your Surgery    IMPORTANT INSTRUCTIONS    Please read and follow these instructions carefully. If you are unable to comply with the below instructions your procedure will be cancelled. Every Night for Three (3) nights before your surgery:  1. Shower with an antibacterial soap, such as Dial, or the soap provided at your preassessment appointment. A shower is better than a bath for cleaning your skin. 2. If needed, ask someone to help you reach all areas of your body. Dont forget to clean your belly button with every shower. The night before your surgery: If you lose your Hibiclens/chlorhexidine please contact surgery center or you can purchase it at a local pharmacy  1. On the night before your surgery, shower with an antibacterial soap, such as Dial, or the soap provided at your preassessment appointment. 2. With one packet of Hibiclens/Chlorhexidine in hand, turn water off.  3. Apply Hibiclens antiseptic skin cleanser with a clean, freshly washed washcloth. ? Gently apply to your body from chin to toes (except the genital area) and especially the area(s) where your incision(s) will be. ? Leave Hibiclens/Chlorhexidine on your skin for at least 20 seconds. CAUTION: If needed, Hibiclens/chlorhexidine may be used to clean the folds of skin of the legs (such as in the area of the groin) and on your buttocks and hips. However, do not use Hibiclens/Chlorhexidine above the neck or in the genital area (your bottom) or put inside any area of your body. 4. Turn the water back on and rinse. 5. Dry gently with a clean, freshly washed towel. 6. After your shower, do not use any powder, deodorant, perfumes or lotion. 7. Use clean, freshly washed towels and washcloths every time you shower. 8. Wear clean, freshly washed pajamas to bed the night before surgery. 9. Sleep on clean, freshly washed sheets.   10. Do not allow pets to sleep in your bed with you. The Morning of your surgery:  1. Shower again thoroughly with an antibacterial soap, such as Dial or the soap provided at your preassessment appointment. If needed, ask someone for help to reach all areas of your body. Dont forget to clean your belly button! Rinse. 2. Dry gently with a clean, freshly washed towel. 3. After your shower, do not use any powder, deodorant, perfumes or lotion prior to surgery. 4. Put on clean, freshly washed clothing. Tips to help prevent infections after your surgery:  1. Protect your surgical wound from germs:  ? Hand washing is the most important thing you and your caregivers can do to prevent infections. ? Keep your bandage clean and dry! ? Do not touch your surgical wound. 2. Use clean, freshly washed towels and washcloths every time you shower; do not share bath linens with others. 3. Until your surgical wound is healed, wear clothing and sleep on bed linens each day that are clean and freshly washed. 4. Do not allow pets to sleep in your bed with you or touch your surgical wound. 5. Do not smoke  smoking delays wound healing. This may be a good time to stop smoking. 6. If you have diabetes, it is important for you to manage your blood sugar levels properly before your surgery as well as after your surgery. Poorly managed blood sugar levels slow down wound healing and prevent you from healing completely. If you lose your Hibiclens/chlorhexidine, please call the [facility], or it is available for purchase at your pharmacy.                ___________________      ___________________      4/5/2022 @ 9203  (Signature of Patient)          (Witness)                   (Date and Time)

## 2022-04-05 NOTE — PERIOP NOTES
[facility] \"Don't shoulder this alone! Tips to prepare and safely care for yourself / Shoulder replacement surgery\" patient handbook was provided & reviewed during the patients pre-admission testing (PAT) appointment. An opportunity for questions was provided, patient verbalized understanding.

## 2022-04-05 NOTE — PERIOP NOTES
Orthopedic and Spine Patients: Instructions on When You Can   Eat or Drink Before Surgery      You have been provided a pre-surgery drink received at your pre-admission testing appointment.  Night before surgery:  o You should drink 1/2 bottle of the  pre-surgery drink at bedtime. No food after midnight!  Day of Surgery:  o Complete 2nd half of the bottle of the pre-surgery drink 1 hour prior to arrival at hospital.  For questions call Pre-Admission Testing at [phone]. They are available from 8:00am-5:00pm, Monday through Friday.

## 2022-04-05 NOTE — PERIOP NOTES
Incentive Spirometer        Using the incentive spirometer helps expand the small air sacs of your lungs, helps you breathe deeply, and helps improve your lung function. Use your incentive spirometer twice a day (10 breaths each time) prior to surgery. How to Use Your Incentive Spirometer:  1. Hold the incentive spirometer in an upright position. 2. Breathe out as usual.   3. Place the mouthpiece in your mouth and seal your lips tightly around it. 4. Take a deep breath. Breathe in slowly and as deeply as possible. Keep the blue flow rate guide between the arrows. 5. Hold your breath as long as possible. Then exhale slowly and allow the piston to fall to the bottom of the column. 6. Rest for a few seconds and repeat steps one through five at least 10 times. PAT Tidal Volume_____1750_____________  x____2____________  Date__4/5/2022____    Malou Bile THE INCENTIVE SPIROMETER WITH YOU TO THE HOSPITAL ON THE DAY OF YOUR SURGERY. Opportunity given to ask and answer questions as well as to observe return demonstration.     Patient signature_____________________________          Witness____________________________

## 2022-04-06 LAB
BACTERIA SPEC CULT: NORMAL
BACTERIA SPEC CULT: NORMAL
SERVICE CMNT-IMP: NORMAL

## 2022-04-06 NOTE — ADVANCED PRACTICE NURSE
PAT Nurse Practitioner   Pre-Operative Chart Review/Assessment:-ORTHOPEDIC/NEUROSURGICAL SPINE                Patient Name:  Tita Liu                                                           Age:   66 y.o.    :  1950     Today's Date:  2022     Date of PAT:   22      Date of Surgery:    2022      Procedure(s):  Right  Total Shoulder Arthroplasty versus reverse total shoulder arthroplasty     Surgeon:   Ismael Nagel     Medical Clearance:  PCP is Dr. Maranda Langley at [facility] ([facility])                   PLAN:      1)  Cardiac Clearance:  Dr. Ana Luisa George       2)  Program for Diabetes Health Consult:  Not indicated-A1C 6.3      3)  Sleep Apnea evaluation:   Has prior dx of RACHEAL (per PMHx) -noncompliant w/ CPAP.  Currently denies witnessed apnea while sleeping        4) Treatment for MRSA/Staph Aureus:  Negative       5) Additional Concerns:  T2DM, at risk for RACHEAL, A fib s/p ablation, B breast CA , PONV                Vital Signs:         Visit Vitals  /63 (BP 1 Location: Left arm, BP Patient Position: Sitting)   Pulse 69   Temp 98.2 °F (36.8 °C)   Resp 16   Ht 5' 6\" (1.676 m)   Wt 96.5 kg (212 lb 11.9 oz)   SpO2 99%   BMI 34.34 kg/m²                        ____________________________________________  PAST MEDICAL HISTORY  Past Medical History:   Diagnosis Date    Arrhythmia     AFIB - CARDIOVERSION TO CORRECT    Arthritis     Cancer ([facility] [address] 2015    BILATERAL BREAST CANCER    Diabetes ([facility] [address]     DM2    Endocarditis 2014    HOSPITALIZED    Hypertension     Ill-defined condition     KIDNEY STONES    PONV (postoperative nausea and vomiting)     Psychiatric disorder     DEPRESSION    Sleep apnea     no cpap      ____________________________________________  PAST SURGICAL HISTORY  Past Surgical History:   Procedure Laterality Date    HX BREAST RECONSTRUCTION Bilateral 2017    HX COLONOSCOPY      HX CYST REMOVAL  1974    HX ENDOSCOPY      HX HEENT      WISDOM TEETH X4    HX HERNIA REPAIR  2018    HX HYSTERECTOMY  1980s    HX MASTECTOMY Bilateral 2016    HX ORTHOPAEDIC  1980s    HAD TWO LUMBAR SURGERIES - PT UNSURE OF PROCEDURE    HX ORTHOPAEDIC  2003    CERVICAL PLATE INSERTED    HX ORTHOPAEDIC  2018    LEFT ANKLE TENDON REPAIR    HX OTHER SURGICAL  11/2021    Ablation of the heart     HX TONSILLECTOMY  CHILDHOOD    HX TUBAL LIGATION        ____________________________________________  HOME MEDICATIONS    Current Outpatient Medications   Medication Sig    escitalopram oxalate (LEXAPRO) 10 mg tablet Take 10 mg by mouth daily.  Accu-Chek Fastclix Lancet Drum misc     omeprazole (PRILOSEC) 40 mg capsule Take 40 mg by mouth daily.  triamterene-hydroCHLOROthiazide (MAXZIDE) 37.5-25 mg per tablet Take 1 Tablet by mouth daily.  metFORMIN ER (GLUCOPHAGE XR) 500 mg tablet Take 500 mg by mouth daily (with dinner).  allopurinol (ZYLOPRIM) 300 mg tablet Take 300 mg by mouth daily.  atorvastatin (LIPITOR) 20 mg tablet Take 20 mg by mouth daily.  apixaban (ELIQUIS) 5 mg tablet Take 5 mg by mouth two (2) times a day.  glipiZIDE (GLUCOTROL) 5 mg tablet Take 5 mg by mouth two (2) times a day.  omega-3 fatty acids/fish oil (FISH OIL EXTRA STRENGTH PO) Take 1 Tab by mouth daily.  sotalol (BETAPACE) 120 mg tablet Take 120 mg by mouth two (2) times a day.      No current facility-administered medications for this encounter.      ____________________________________________  ALLERGIES  No Known Allergies   ____________________________________________  SOCIAL HISTORY  Social History     Tobacco Use    Smoking status: Never Smoker    Smokeless tobacco: Never Used   Substance Use Topics    Alcohol use: No      ____________________________________________  COVID VACCINATION STATUS:      Internal Administration   First Dose COVID-19, Niko Seals, Primary or Immunocompromised Series, MRNA, PF, 100mcg/0.5mL  03/16/2021   Second Dose COVID-19, Niko Seals, Primary or Immunocompromised Series, MRNA, PF, 100mcg/0.5mL  04/13/2021      Last COVID Lab No results found for: Hazel Sanders, RCV2CT, CVD2M, COV2, XPLCVT, [address] [facility], [address] [place] [address], [address] [place], [address] [address] [place]                   Labs:     Hospital Outpatient Visit on 04/05/2022   Component Date Value Ref Range Status    Crossmatch Expiration 04/05/2022 04/16/2022,2359   Final    ABO/Rh(D) 04/05/2022 A POSITIVE   Final    Antibody screen 04/05/2022 NEG   Final    WBC 04/05/2022 9.5  3.6 - 11.0 K/uL Final    RBC 04/05/2022 4.85  3.80 - 5.20 M/uL Final    HGB 04/05/2022 14.7  11.5 - 16.0 g/dL Final    HCT 04/05/2022 44.6  35.0 - 47.0 % Final    MCV 04/05/2022 92.0  80.0 - 99.0 FL Final    MCH 04/05/2022 30.3  26.0 - 34.0 PG Final    MCHC 04/05/2022 33.0  30.0 - 36.5 g/dL Final    RDW 04/05/2022 14.3  11.5 - 14.5 % Final    PLATELET 02/63/6074 175  150 - 400 K/uL Final    MPV 04/05/2022 9.2  8.9 - 12.9 FL Final    NRBC 04/05/2022 0.0  0  WBC Final    ABSOLUTE NRBC 04/05/2022 0.00  0.00 - 0.01 K/uL Final    Hemoglobin A1c 04/05/2022 6.3* 4.0 - 5.6 % Final    Comment: NEW METHOD  PLEASE NOTE NEW REFERENCE RANGE  (NOTE)  HbA1C Interpretive Ranges  <5.7              Normal  5.7 - 6.4         Consider Prediabetes  >6.5              Consider Diabetes      Est. average glucose 04/05/2022 134  mg/dL Final    INR 04/05/2022 1.0  0.9 - 1.1   Final    A single therapeutic range for Vit K antagonists may not be optimal for all indications - see June, 2008 issue of Chest, American College of Chest Physicians Evidence-Based Clinical Practice Guidelines, 8th Edition.     Prothrombin time 04/05/2022 10.5  9.0 - 11.1 sec Final    Color 04/05/2022 YELLOW/STRAW    Final    Color Reference Range: Straw, Yellow or Dark Yellow    Appearance 04/05/2022 CLEAR  CLEAR   Final    Specific gravity 04/05/2022 1.009  1.003 - 1.030   Final    pH (UA) 04/05/2022 5.5  5.0 - 8.0   Final    Protein 04/05/2022 Negative  NEG mg/dL Final    Glucose 04/05/2022 Negative  NEG mg/dL Final    Ketone 04/05/2022 Negative  NEG mg/dL Final    Bilirubin 04/05/2022 Negative  NEG   Final    Blood 04/05/2022 Negative  NEG   Final    Urobilinogen 04/05/2022 0.2  0.2 - 1.0 EU/dL Final    Nitrites 04/05/2022 Negative  NEG   Final    Leukocyte Esterase 04/05/2022 Negative  NEG   Final    WBC 04/05/2022 0-4  0 - 4 /hpf Final    RBC 04/05/2022 0-5  0 - 5 /hpf Final    Epithelial cells 04/05/2022 FEW  FEW /lpf Final    Epithelial cell category consists of squamous cells and /or transitional urothelial cells. Renal tubular cells, if present, are separately identified as such.  Bacteria 04/05/2022 Negative  NEG /hpf Final    UA:UC IF INDICATED 04/05/2022 CULTURE NOT INDICATED BY UA RESULT  CNI   Final    Hyaline cast 04/05/2022 0-2  0 - 5 /lpf Final    Sodium 04/05/2022 138  136 - 145 mmol/L Final    Potassium 04/05/2022 4.7  3.5 - 5.1 mmol/L Final    Chloride 04/05/2022 104  97 - 108 mmol/L Final    CO2 04/05/2022 32  21 - 32 mmol/L Final    Anion gap 04/05/2022 2* 5 - 15 mmol/L Final    Glucose 04/05/2022 118* 65 - 100 mg/dL Final    BUN 04/05/2022 22* 6 - 20 MG/DL Final    Creatinine 04/05/2022 0.99  0.55 - 1.02 MG/DL Final    BUN/Creatinine ratio 04/05/2022 22* 12 - 20   Final    GFR est AA 04/05/2022 >60  >60 ml/min/1.73m2 Final    GFR est non-AA 04/05/2022 55* >60 ml/min/1.73m2 Final    Estimated GFR is calculated using the IDMS-traceable Modification of Diet in Renal Disease (MDRD) Study equation, reported for both  Americans (GFRAA) and non- Americans (GFRNA), and normalized to 1.73m2 body surface area. The physician must decide which value applies to the patient.  Calcium 04/05/2022 9.3  8.5 - 10.1 MG/DL Final    Bilirubin, total 04/05/2022 0.3  0.2 - 1.0 MG/DL Final    ALT (SGPT) 04/05/2022 28  12 - 78 U/L Final    AST (SGOT) 04/05/2022 24  15 - 37 U/L Final    Alk.  phosphatase 04/05/2022 108  45 - 117 U/L Final    Protein, total 04/05/2022 7.5  6.4 - 8.2 g/dL Final    Albumin 04/05/2022 3.7  3.5 - 5.0 g/dL Final    Globulin 04/05/2022 3.8  2.0 - 4.0 g/dL Final    A-G Ratio 04/05/2022 1.0* 1.1 - 2.2   Final        XR Results (most recent):    Results from Hospital Encounter encounter on 02/06/19    XR SPINE CERV PA LAT ODONT 3 V MAX    Narrative  Compliance only    Indication: Spine fusion. Spot fluoroscopic AP and lateral views of the cervical spine demonstrate  anterior cervical fusion in progress from C5-C7. Impression  IMPRESSION: Anterior cervical fusion in progress. Skin:   Denies open wounds, cuts, sores, rashes or other areas of concern in PAT assessment.         Kirsten Shaver NP

## 2022-04-12 ENCOUNTER — ANESTHESIA EVENT (OUTPATIENT)
Dept: SURGERY | Age: 72
End: 2022-04-12
Payer: MEDICARE

## 2022-04-13 ENCOUNTER — HOSPITAL ENCOUNTER (OUTPATIENT)
Age: 72
Setting detail: OBSERVATION
Discharge: HOME HEALTH | End: 2022-04-14
Attending: ORTHOPAEDIC SURGERY | Admitting: ORTHOPAEDIC SURGERY
Payer: MEDICARE

## 2022-04-13 ENCOUNTER — APPOINTMENT (OUTPATIENT)
Dept: GENERAL RADIOLOGY | Age: 72
End: 2022-04-13
Attending: ORTHOPAEDIC SURGERY
Payer: MEDICARE

## 2022-04-13 ENCOUNTER — ANESTHESIA (OUTPATIENT)
Dept: SURGERY | Age: 72
End: 2022-04-13
Payer: MEDICARE

## 2022-04-13 DIAGNOSIS — M48.02 CERVICAL STENOSIS OF SPINE: ICD-10-CM

## 2022-04-13 DIAGNOSIS — M19.011 PRIMARY OSTEOARTHRITIS OF RIGHT SHOULDER: Primary | ICD-10-CM

## 2022-04-13 LAB
GLUCOSE BLD STRIP.AUTO-MCNC: 132 MG/DL (ref 65–117)
GLUCOSE BLD STRIP.AUTO-MCNC: 155 MG/DL (ref 65–117)
GLUCOSE BLD STRIP.AUTO-MCNC: 184 MG/DL (ref 65–117)
GLUCOSE BLD STRIP.AUTO-MCNC: 186 MG/DL (ref 65–117)
SERVICE CMNT-IMP: ABNORMAL

## 2022-04-13 PROCEDURE — 64415 NJX AA&/STRD BRCH PLXS IMG: CPT | Performed by: ANESTHESIOLOGY

## 2022-04-13 PROCEDURE — 74011000250 HC RX REV CODE- 250: Performed by: NURSE ANESTHETIST, CERTIFIED REGISTERED

## 2022-04-13 PROCEDURE — 77030008684 HC TU ET CUF COVD -B: Performed by: ANESTHESIOLOGY

## 2022-04-13 PROCEDURE — C1776 JOINT DEVICE (IMPLANTABLE): HCPCS | Performed by: ORTHOPAEDIC SURGERY

## 2022-04-13 PROCEDURE — 74011250637 HC RX REV CODE- 250/637: Performed by: ORTHOPAEDIC SURGERY

## 2022-04-13 PROCEDURE — 77030026438 HC STYL ET INTUB CARD -A: Performed by: ANESTHESIOLOGY

## 2022-04-13 PROCEDURE — 64418 NJX AA&/STRD SPRSCAP NRV: CPT | Performed by: ANESTHESIOLOGY

## 2022-04-13 PROCEDURE — 77030019908 HC STETH ESOPH SIMS -A: Performed by: ANESTHESIOLOGY

## 2022-04-13 PROCEDURE — 74011250636 HC RX REV CODE- 250/636: Performed by: NURSE ANESTHETIST, CERTIFIED REGISTERED

## 2022-04-13 PROCEDURE — 74011250636 HC RX REV CODE- 250/636: Performed by: ORTHOPAEDIC SURGERY

## 2022-04-13 PROCEDURE — 74011000250 HC RX REV CODE- 250: Performed by: ANESTHESIOLOGY

## 2022-04-13 PROCEDURE — 74011250636 HC RX REV CODE- 250/636: Performed by: ANESTHESIOLOGY

## 2022-04-13 PROCEDURE — 77030010507 HC ADH SKN DERMBND J&J -B: Performed by: ORTHOPAEDIC SURGERY

## 2022-04-13 PROCEDURE — 82962 GLUCOSE BLOOD TEST: CPT

## 2022-04-13 PROCEDURE — G0378 HOSPITAL OBSERVATION PER HR: HCPCS

## 2022-04-13 PROCEDURE — 77030006835 HC BLD SAW SAG STRY -B: Performed by: ORTHOPAEDIC SURGERY

## 2022-04-13 PROCEDURE — 76210000032 HC AMBSU PH I REC 3 TO 3.5 HR: Performed by: ORTHOPAEDIC SURGERY

## 2022-04-13 PROCEDURE — 76060000064 HC AMB SURG ANES 2 TO 2.5 HR: Performed by: ORTHOPAEDIC SURGERY

## 2022-04-13 PROCEDURE — 77030036638 HC ACC KT GPS KNE V2 EXAC -D: Performed by: ORTHOPAEDIC SURGERY

## 2022-04-13 PROCEDURE — 2709999900 HC NON-CHARGEABLE SUPPLY: Performed by: ORTHOPAEDIC SURGERY

## 2022-04-13 PROCEDURE — 77030002933 HC SUT MCRYL J&J -A: Performed by: ORTHOPAEDIC SURGERY

## 2022-04-13 PROCEDURE — 76030000021 HC AMB SURG 2 TO 2.5 HR INTENSV-TIER 1: Performed by: ORTHOPAEDIC SURGERY

## 2022-04-13 PROCEDURE — C9290 INJ, BUPIVACAINE LIPOSOME: HCPCS | Performed by: ANESTHESIOLOGY

## 2022-04-13 PROCEDURE — 77030018673: Performed by: ORTHOPAEDIC SURGERY

## 2022-04-13 PROCEDURE — 77030018547 HC SUT ETHBND1 J&J -B: Performed by: ORTHOPAEDIC SURGERY

## 2022-04-13 PROCEDURE — 77030031139 HC SUT VCRL2 J&J -A: Performed by: ORTHOPAEDIC SURGERY

## 2022-04-13 PROCEDURE — 73030 X-RAY EXAM OF SHOULDER: CPT

## 2022-04-13 PROCEDURE — 74011000250 HC RX REV CODE- 250: Performed by: ORTHOPAEDIC SURGERY

## 2022-04-13 PROCEDURE — 77030021352 HC CBL LD SYS DISP COVD -B: Performed by: ORTHOPAEDIC SURGERY

## 2022-04-13 PROCEDURE — C1713 ANCHOR/SCREW BN/BN,TIS/BN: HCPCS | Performed by: ORTHOPAEDIC SURGERY

## 2022-04-13 PROCEDURE — 23472 RECONSTRUCT SHOULDER JOINT: CPT | Performed by: ORTHOPAEDIC SURGERY

## 2022-04-13 DEVICE — HEAD HUM DIA44MM SHT SHLDR FOR HEMIARTHROPLASTY EQUINOXE: Type: IMPLANTABLE DEVICE | Site: SHOULDER | Status: FUNCTIONAL

## 2022-04-13 DEVICE — SCREW BNE AD PED L47MM DIA15MM CANC SHLDR NONLOCKING: Type: IMPLANTABLE DEVICE | Site: SHOULDER | Status: FUNCTIONAL

## 2022-04-13 DEVICE — COBALT G-HV BONE CEMENT 40GM
Type: IMPLANTABLE DEVICE | Site: SHOULDER | Status: FUNCTIONAL
Brand: DJO SURGICAL

## 2022-04-13 DEVICE — PLATE BNE 4.5MM ANAT REPLICATOR O/S EQUINOXE: Type: IMPLANTABLE DEVICE | Site: SHOULDER | Status: FUNCTIONAL

## 2022-04-13 DEVICE — IMPLANTABLE DEVICE: Type: IMPLANTABLE DEVICE | Site: SHOULDER | Status: FUNCTIONAL

## 2022-04-13 DEVICE — SHOULDER S1 TOT STD ANAT -- IMPL CAPPED S1: Type: IMPLANTABLE DEVICE | Site: SHOULDER | Status: FUNCTIONAL

## 2022-04-13 DEVICE — STEM HUM DIA9MM SHLDR PRI PRESSFIT EQUINOXE: Type: IMPLANTABLE DEVICE | Site: SHOULDER | Status: FUNCTIONAL

## 2022-04-13 RX ORDER — SODIUM CHLORIDE 0.9 % (FLUSH) 0.9 %
5-40 SYRINGE (ML) INJECTION AS NEEDED
Status: DISCONTINUED | OUTPATIENT
Start: 2022-04-13 | End: 2022-04-13 | Stop reason: HOSPADM

## 2022-04-13 RX ORDER — LIDOCAINE HYDROCHLORIDE 10 MG/ML
0.1 INJECTION, SOLUTION EPIDURAL; INFILTRATION; INTRACAUDAL; PERINEURAL AS NEEDED
Status: DISCONTINUED | OUTPATIENT
Start: 2022-04-13 | End: 2022-04-13 | Stop reason: HOSPADM

## 2022-04-13 RX ORDER — MORPHINE SULFATE 2 MG/ML
2 INJECTION, SOLUTION INTRAMUSCULAR; INTRAVENOUS
Status: DISCONTINUED | OUTPATIENT
Start: 2022-04-13 | End: 2022-04-14 | Stop reason: HOSPADM

## 2022-04-13 RX ORDER — ONDANSETRON 2 MG/ML
4 INJECTION INTRAMUSCULAR; INTRAVENOUS
Status: DISCONTINUED | OUTPATIENT
Start: 2022-04-13 | End: 2022-04-14 | Stop reason: HOSPADM

## 2022-04-13 RX ORDER — MIDAZOLAM HYDROCHLORIDE 1 MG/ML
INJECTION, SOLUTION INTRAMUSCULAR; INTRAVENOUS AS NEEDED
Status: DISCONTINUED | OUTPATIENT
Start: 2022-04-13 | End: 2022-04-13 | Stop reason: HOSPADM

## 2022-04-13 RX ORDER — SODIUM CHLORIDE 0.9 % (FLUSH) 0.9 %
5-40 SYRINGE (ML) INJECTION EVERY 8 HOURS
Status: DISCONTINUED | OUTPATIENT
Start: 2022-04-13 | End: 2022-04-13 | Stop reason: HOSPADM

## 2022-04-13 RX ORDER — OXYCODONE AND ACETAMINOPHEN 5; 325 MG/1; MG/1
1 TABLET ORAL
Status: DISCONTINUED | OUTPATIENT
Start: 2022-04-13 | End: 2022-04-13 | Stop reason: HOSPADM

## 2022-04-13 RX ORDER — FENTANYL CITRATE 50 UG/ML
INJECTION, SOLUTION INTRAMUSCULAR; INTRAVENOUS
Status: DISPENSED
Start: 2022-04-13 | End: 2022-04-14

## 2022-04-13 RX ORDER — NALOXONE HYDROCHLORIDE 0.4 MG/ML
0.4 INJECTION, SOLUTION INTRAMUSCULAR; INTRAVENOUS; SUBCUTANEOUS AS NEEDED
Status: DISCONTINUED | OUTPATIENT
Start: 2022-04-13 | End: 2022-04-14 | Stop reason: HOSPADM

## 2022-04-13 RX ORDER — METFORMIN HYDROCHLORIDE EXTENDED-RELEASE TABLETS 500 MG/1
500 TABLET, FILM COATED, EXTENDED RELEASE ORAL
Status: DISCONTINUED | OUTPATIENT
Start: 2022-04-13 | End: 2022-04-14 | Stop reason: HOSPADM

## 2022-04-13 RX ORDER — OXYCODONE HYDROCHLORIDE 5 MG/1
5 TABLET ORAL
Status: DISCONTINUED | OUTPATIENT
Start: 2022-04-13 | End: 2022-04-14 | Stop reason: HOSPADM

## 2022-04-13 RX ORDER — ACETAMINOPHEN 500 MG
TABLET ORAL
Status: DISPENSED
Start: 2022-04-13 | End: 2022-04-14

## 2022-04-13 RX ORDER — FAMOTIDINE 20 MG/1
20 TABLET, FILM COATED ORAL 2 TIMES DAILY
Status: DISCONTINUED | OUTPATIENT
Start: 2022-04-13 | End: 2022-04-14 | Stop reason: HOSPADM

## 2022-04-13 RX ORDER — ESCITALOPRAM OXALATE 10 MG/1
10 TABLET ORAL DAILY
Status: DISCONTINUED | OUTPATIENT
Start: 2022-04-14 | End: 2022-04-14 | Stop reason: HOSPADM

## 2022-04-13 RX ORDER — VANCOMYCIN HYDROCHLORIDE 1 G/20ML
INJECTION, POWDER, LYOPHILIZED, FOR SOLUTION INTRAVENOUS AS NEEDED
Status: DISCONTINUED | OUTPATIENT
Start: 2022-04-13 | End: 2022-04-13 | Stop reason: HOSPADM

## 2022-04-13 RX ORDER — MIDAZOLAM HYDROCHLORIDE 1 MG/ML
INJECTION, SOLUTION INTRAMUSCULAR; INTRAVENOUS
Status: COMPLETED
Start: 2022-04-13 | End: 2022-04-13

## 2022-04-13 RX ORDER — SODIUM CHLORIDE 0.9 % (FLUSH) 0.9 %
5-40 SYRINGE (ML) INJECTION AS NEEDED
Status: DISCONTINUED | OUTPATIENT
Start: 2022-04-13 | End: 2022-04-14 | Stop reason: HOSPADM

## 2022-04-13 RX ORDER — SOTALOL HYDROCHLORIDE 80 MG/1
120 TABLET ORAL 2 TIMES DAILY
Status: DISCONTINUED | OUTPATIENT
Start: 2022-04-14 | End: 2022-04-14 | Stop reason: HOSPADM

## 2022-04-13 RX ORDER — SODIUM CHLORIDE 0.9 % (FLUSH) 0.9 %
5-40 SYRINGE (ML) INJECTION EVERY 8 HOURS
Status: DISCONTINUED | OUTPATIENT
Start: 2022-04-13 | End: 2022-04-14 | Stop reason: HOSPADM

## 2022-04-13 RX ORDER — SODIUM CHLORIDE, SODIUM LACTATE, POTASSIUM CHLORIDE, CALCIUM CHLORIDE 600; 310; 30; 20 MG/100ML; MG/100ML; MG/100ML; MG/100ML
INJECTION, SOLUTION INTRAVENOUS
Status: DISCONTINUED | OUTPATIENT
Start: 2022-04-13 | End: 2022-04-13 | Stop reason: HOSPADM

## 2022-04-13 RX ORDER — ALLOPURINOL 300 MG/1
300 TABLET ORAL DAILY
Status: DISCONTINUED | OUTPATIENT
Start: 2022-04-14 | End: 2022-04-14 | Stop reason: HOSPADM

## 2022-04-13 RX ORDER — TRIAMTERENE/HYDROCHLOROTHIAZID 37.5-25 MG
1 TABLET ORAL DAILY
Status: DISCONTINUED | OUTPATIENT
Start: 2022-04-14 | End: 2022-04-14 | Stop reason: HOSPADM

## 2022-04-13 RX ORDER — CEFAZOLIN SODIUM/WATER 2 G/20 ML
SYRINGE (ML) INTRAVENOUS AS NEEDED
Status: DISCONTINUED | OUTPATIENT
Start: 2022-04-13 | End: 2022-04-13 | Stop reason: HOSPADM

## 2022-04-13 RX ORDER — DROPERIDOL 2.5 MG/ML
0.62 INJECTION, SOLUTION INTRAMUSCULAR; INTRAVENOUS AS NEEDED
Status: DISCONTINUED | OUTPATIENT
Start: 2022-04-13 | End: 2022-04-13 | Stop reason: HOSPADM

## 2022-04-13 RX ORDER — ONDANSETRON 2 MG/ML
INJECTION INTRAMUSCULAR; INTRAVENOUS AS NEEDED
Status: DISCONTINUED | OUTPATIENT
Start: 2022-04-13 | End: 2022-04-13 | Stop reason: HOSPADM

## 2022-04-13 RX ORDER — PROPOFOL 10 MG/ML
INJECTION, EMULSION INTRAVENOUS AS NEEDED
Status: DISCONTINUED | OUTPATIENT
Start: 2022-04-13 | End: 2022-04-13 | Stop reason: HOSPADM

## 2022-04-13 RX ORDER — LIDOCAINE HYDROCHLORIDE 20 MG/ML
INJECTION, SOLUTION EPIDURAL; INFILTRATION; INTRACAUDAL; PERINEURAL AS NEEDED
Status: DISCONTINUED | OUTPATIENT
Start: 2022-04-13 | End: 2022-04-13 | Stop reason: HOSPADM

## 2022-04-13 RX ORDER — FACIAL-BODY WIPES
10 EACH TOPICAL DAILY PRN
Status: DISCONTINUED | OUTPATIENT
Start: 2022-04-15 | End: 2022-04-14 | Stop reason: HOSPADM

## 2022-04-13 RX ORDER — MORPHINE SULFATE 10 MG/ML
2 INJECTION, SOLUTION INTRAMUSCULAR; INTRAVENOUS
Status: DISCONTINUED | OUTPATIENT
Start: 2022-04-13 | End: 2022-04-13 | Stop reason: HOSPADM

## 2022-04-13 RX ORDER — OXYCODONE HYDROCHLORIDE 5 MG/1
5 TABLET ORAL
Qty: 28 TABLET | Refills: 0 | Status: SHIPPED | OUTPATIENT
Start: 2022-04-13 | End: 2022-04-20

## 2022-04-13 RX ORDER — GLYCOPYRROLATE 0.2 MG/ML
INJECTION INTRAMUSCULAR; INTRAVENOUS AS NEEDED
Status: DISCONTINUED | OUTPATIENT
Start: 2022-04-13 | End: 2022-04-13 | Stop reason: HOSPADM

## 2022-04-13 RX ORDER — ACETAMINOPHEN 500 MG
1000 TABLET ORAL
Status: COMPLETED | OUTPATIENT
Start: 2022-04-13 | End: 2022-04-13

## 2022-04-13 RX ORDER — SODIUM CHLORIDE 9 MG/ML
125 INJECTION, SOLUTION INTRAVENOUS CONTINUOUS
Status: DISCONTINUED | OUTPATIENT
Start: 2022-04-13 | End: 2022-04-14 | Stop reason: HOSPADM

## 2022-04-13 RX ORDER — OXYCODONE HYDROCHLORIDE 5 MG/1
10 TABLET ORAL
Status: DISCONTINUED | OUTPATIENT
Start: 2022-04-13 | End: 2022-04-14 | Stop reason: HOSPADM

## 2022-04-13 RX ORDER — NEOSTIGMINE METHYLSULFATE 1 MG/ML
INJECTION, SOLUTION INTRAVENOUS AS NEEDED
Status: DISCONTINUED | OUTPATIENT
Start: 2022-04-13 | End: 2022-04-13 | Stop reason: HOSPADM

## 2022-04-13 RX ORDER — HYDROXYZINE HYDROCHLORIDE 10 MG/1
10 TABLET, FILM COATED ORAL
Status: DISCONTINUED | OUTPATIENT
Start: 2022-04-13 | End: 2022-04-14 | Stop reason: HOSPADM

## 2022-04-13 RX ORDER — ATORVASTATIN CALCIUM 20 MG/1
20 TABLET, FILM COATED ORAL DAILY
Status: DISCONTINUED | OUTPATIENT
Start: 2022-04-14 | End: 2022-04-14 | Stop reason: HOSPADM

## 2022-04-13 RX ORDER — ACETAMINOPHEN 500 MG
1000 TABLET ORAL EVERY 6 HOURS
Status: DISCONTINUED | OUTPATIENT
Start: 2022-04-13 | End: 2022-04-14 | Stop reason: HOSPADM

## 2022-04-13 RX ORDER — BUPIVACAINE HYDROCHLORIDE 5 MG/ML
INJECTION, SOLUTION EPIDURAL; INTRACAUDAL
Status: COMPLETED
Start: 2022-04-13 | End: 2022-04-13

## 2022-04-13 RX ORDER — FENTANYL CITRATE 50 UG/ML
INJECTION, SOLUTION INTRAMUSCULAR; INTRAVENOUS AS NEEDED
Status: DISCONTINUED | OUTPATIENT
Start: 2022-04-13 | End: 2022-04-13 | Stop reason: HOSPADM

## 2022-04-13 RX ORDER — BUPIVACAINE HYDROCHLORIDE 5 MG/ML
INJECTION, SOLUTION EPIDURAL; INTRACAUDAL
Status: COMPLETED | OUTPATIENT
Start: 2022-04-13 | End: 2022-04-13

## 2022-04-13 RX ORDER — FENTANYL CITRATE 50 UG/ML
25 INJECTION, SOLUTION INTRAMUSCULAR; INTRAVENOUS
Status: COMPLETED | OUTPATIENT
Start: 2022-04-13 | End: 2022-04-13

## 2022-04-13 RX ORDER — DEXAMETHASONE SODIUM PHOSPHATE 4 MG/ML
INJECTION, SOLUTION INTRA-ARTICULAR; INTRALESIONAL; INTRAMUSCULAR; INTRAVENOUS; SOFT TISSUE AS NEEDED
Status: DISCONTINUED | OUTPATIENT
Start: 2022-04-13 | End: 2022-04-13 | Stop reason: HOSPADM

## 2022-04-13 RX ORDER — GLIPIZIDE 5 MG/1
5 TABLET ORAL 2 TIMES DAILY
Status: DISCONTINUED | OUTPATIENT
Start: 2022-04-13 | End: 2022-04-14 | Stop reason: HOSPADM

## 2022-04-13 RX ORDER — DIPHENHYDRAMINE HYDROCHLORIDE 50 MG/ML
12.5 INJECTION, SOLUTION INTRAMUSCULAR; INTRAVENOUS AS NEEDED
Status: DISCONTINUED | OUTPATIENT
Start: 2022-04-13 | End: 2022-04-13 | Stop reason: HOSPADM

## 2022-04-13 RX ORDER — AMOXICILLIN 250 MG
1 CAPSULE ORAL 2 TIMES DAILY
Status: DISCONTINUED | OUTPATIENT
Start: 2022-04-13 | End: 2022-04-14 | Stop reason: HOSPADM

## 2022-04-13 RX ORDER — HYDROMORPHONE HYDROCHLORIDE 1 MG/ML
.2-.5 INJECTION, SOLUTION INTRAMUSCULAR; INTRAVENOUS; SUBCUTANEOUS ONCE
Status: DISCONTINUED | OUTPATIENT
Start: 2022-04-13 | End: 2022-04-13 | Stop reason: HOSPADM

## 2022-04-13 RX ORDER — SODIUM CHLORIDE, SODIUM LACTATE, POTASSIUM CHLORIDE, CALCIUM CHLORIDE 600; 310; 30; 20 MG/100ML; MG/100ML; MG/100ML; MG/100ML
25 INJECTION, SOLUTION INTRAVENOUS CONTINUOUS
Status: DISCONTINUED | OUTPATIENT
Start: 2022-04-13 | End: 2022-04-13 | Stop reason: HOSPADM

## 2022-04-13 RX ORDER — POLYETHYLENE GLYCOL 3350 17 G/17G
17 POWDER, FOR SOLUTION ORAL DAILY
Status: DISCONTINUED | OUTPATIENT
Start: 2022-04-14 | End: 2022-04-14 | Stop reason: HOSPADM

## 2022-04-13 RX ORDER — ROCURONIUM BROMIDE 10 MG/ML
INJECTION, SOLUTION INTRAVENOUS AS NEEDED
Status: DISCONTINUED | OUTPATIENT
Start: 2022-04-13 | End: 2022-04-13 | Stop reason: HOSPADM

## 2022-04-13 RX ADMIN — Medication 1000 MG: at 16:09

## 2022-04-13 RX ADMIN — Medication 3 MG: at 13:50

## 2022-04-13 RX ADMIN — FENTANYL CITRATE 25 MCG: 0.05 INJECTION, SOLUTION INTRAMUSCULAR; INTRAVENOUS at 16:11

## 2022-04-13 RX ADMIN — ONDANSETRON HYDROCHLORIDE 4 MG: 2 INJECTION, SOLUTION INTRAMUSCULAR; INTRAVENOUS at 13:51

## 2022-04-13 RX ADMIN — SODIUM CHLORIDE, POTASSIUM CHLORIDE, SODIUM LACTATE AND CALCIUM CHLORIDE 25 ML/HR: 600; 310; 30; 20 INJECTION, SOLUTION INTRAVENOUS at 16:00

## 2022-04-13 RX ADMIN — SODIUM CHLORIDE 125 ML/HR: 9 INJECTION, SOLUTION INTRAVENOUS at 18:35

## 2022-04-13 RX ADMIN — SODIUM CHLORIDE, POTASSIUM CHLORIDE, SODIUM LACTATE AND CALCIUM CHLORIDE 25 ML/HR: 600; 310; 30; 20 INJECTION, SOLUTION INTRAVENOUS at 11:05

## 2022-04-13 RX ADMIN — FENTANYL CITRATE 25 MCG: 0.05 INJECTION, SOLUTION INTRAMUSCULAR; INTRAVENOUS at 16:52

## 2022-04-13 RX ADMIN — BUPIVACAINE 10 ML: 13.3 INJECTION, SUSPENSION, LIPOSOMAL INFILTRATION at 11:35

## 2022-04-13 RX ADMIN — ROCURONIUM BROMIDE 10 MG: 10 INJECTION INTRAVENOUS at 13:35

## 2022-04-13 RX ADMIN — ACETAMINOPHEN 1000 MG: 500 TABLET ORAL at 21:25

## 2022-04-13 RX ADMIN — Medication 2 G: at 12:27

## 2022-04-13 RX ADMIN — GLIPIZIDE 5 MG: 5 TABLET ORAL at 18:06

## 2022-04-13 RX ADMIN — SODIUM CHLORIDE, PRESERVATIVE FREE 10 ML: 5 INJECTION INTRAVENOUS at 18:35

## 2022-04-13 RX ADMIN — PROPOFOL 200 MG: 10 INJECTION, EMULSION INTRAVENOUS at 12:18

## 2022-04-13 RX ADMIN — FENTANYL CITRATE 25 MCG: 50 INJECTION, SOLUTION INTRAMUSCULAR; INTRAVENOUS at 12:18

## 2022-04-13 RX ADMIN — SODIUM CHLORIDE, POTASSIUM CHLORIDE, SODIUM LACTATE AND CALCIUM CHLORIDE: 600; 310; 30; 20 INJECTION, SOLUTION INTRAVENOUS at 12:14

## 2022-04-13 RX ADMIN — GLYCOPYRROLATE 0.4 MG: 0.2 INJECTION, SOLUTION INTRAMUSCULAR; INTRAVENOUS at 13:50

## 2022-04-13 RX ADMIN — CEFAZOLIN SODIUM 2 G: 1 INJECTION, POWDER, FOR SOLUTION INTRAMUSCULAR; INTRAVENOUS at 19:56

## 2022-04-13 RX ADMIN — METFORMIN 500 MG: 500 TABLET, EXTENDED RELEASE ORAL at 18:06

## 2022-04-13 RX ADMIN — FAMOTIDINE 20 MG: 20 TABLET ORAL at 18:06

## 2022-04-13 RX ADMIN — BUPIVACAINE HYDROCHLORIDE 15 ML: 5 INJECTION, SOLUTION EPIDURAL; INTRACAUDAL; PERINEURAL at 11:35

## 2022-04-13 RX ADMIN — FENTANYL CITRATE 25 MCG: 0.05 INJECTION, SOLUTION INTRAMUSCULAR; INTRAVENOUS at 16:45

## 2022-04-13 RX ADMIN — OXYCODONE 10 MG: 5 TABLET ORAL at 21:25

## 2022-04-13 RX ADMIN — DEXAMETHASONE SODIUM PHOSPHATE 4 MG: 4 INJECTION, SOLUTION INTRAMUSCULAR; INTRAVENOUS at 12:23

## 2022-04-13 RX ADMIN — LIDOCAINE HYDROCHLORIDE 100 MG: 20 INJECTION, SOLUTION INTRAVENOUS at 12:18

## 2022-04-13 RX ADMIN — SODIUM CHLORIDE, PRESERVATIVE FREE 10 ML: 5 INJECTION INTRAVENOUS at 21:28

## 2022-04-13 RX ADMIN — FENTANYL CITRATE 25 MCG: 0.05 INJECTION, SOLUTION INTRAMUSCULAR; INTRAVENOUS at 16:09

## 2022-04-13 RX ADMIN — OXYCODONE 10 MG: 5 TABLET ORAL at 18:05

## 2022-04-13 RX ADMIN — ROCURONIUM BROMIDE 15 MG: 10 INJECTION INTRAVENOUS at 13:15

## 2022-04-13 RX ADMIN — ROCURONIUM BROMIDE 5 MG: 10 INJECTION INTRAVENOUS at 12:18

## 2022-04-13 RX ADMIN — SENNOSIDES AND DOCUSATE SODIUM 1 TABLET: 50; 8.6 TABLET ORAL at 18:06

## 2022-04-13 RX ADMIN — MIDAZOLAM HYDROCHLORIDE 2 MG: 1 INJECTION, SOLUTION INTRAMUSCULAR; INTRAVENOUS at 11:31

## 2022-04-13 NOTE — PERIOP NOTES
Reviewed instructions with  in case patient goes home. Pt still very sleepy and but Sao2 remain at 95% on room air. Dr. Aparna Dumas stated she could go home if meets criteria. Post op xray has been done.

## 2022-04-13 NOTE — ANESTHESIA PREPROCEDURE EVALUATION
Anesthetic History     PONV     Comments: Slow to wake     Review of Systems / Medical History  Patient summary reviewed, nursing notes reviewed and pertinent labs reviewed    Pulmonary        Sleep apnea: No treatment           Neuro/Psych         Psychiatric history     Cardiovascular    Hypertension        Dysrhythmias : atrial fibrillation      Exercise tolerance: >4 METS  Comments: Had A fib ablation 11/21    Cardiology has cleared pt  for procedure. Off Eliquis x 3 days   GI/Hepatic/Renal         Renal disease: stones       Endo/Other    Diabetes    Arthritis and cancer (bilateral breasts, 2015)     Other Findings            Physical Exam    Airway  Mallampati: I  TM Distance: 4 - 6 cm  Neck ROM: normal range of motion   Mouth opening: Normal     Cardiovascular    Rhythm: regular  Rate: normal         Dental    Dentition: Caps/crowns  Comments:  On molars   Pulmonary  Breath sounds clear to auscultation               Abdominal  GI exam deferred       Other Findings            Anesthetic Plan    ASA: 2  Anesthesia type: general and regional - interscalene block      Post-op pain plan if not by surgeon: peripheral nerve block single    Induction: Intravenous  Anesthetic plan and risks discussed with: Patient      Exparel for block

## 2022-04-13 NOTE — DISCHARGE INSTRUCTIONS
Exparel is a numbing medication used in your nerve block to help with post-operative pain control. Expect the effects of numbing to last for approximately 72 hours (3 days). You may have control of arm in 24 hours. You will be wearing an Exparel wristband to alert the Emergency Department and/or hospital that you have received this medication if need to be seen. Do not remove the wristband until you have full feeling in area. When you start to have discomfort in your surgical arm, begin Tylenol and take according to your discharge instructions. When you start to feel PAIN, begin to take the Oxycodone WITH FOOD and take according to your discharge instructions. Shoulder Replacement:  Postoperative Instructions  Dr. Мария Gunter    Pain control: Taking Acetaminophen regularly as stated on bottle helps control pain so won't need as much pain medication. Typically, we will prescribe a narcotic. Usually 1-2 tabs every four hours is sufficient for the pain. Most patients need this only for the first few weeks. You should discontinue this as the pain decreases. You should not drive while taking any narcotic pain medications. No pain medications refills can be provided after 3pm on Fridays or over the weekend. Constipation:  Pain medicines and anesthesia can be constipating-this can be prevented by gentle physical activity and drinking plenty of fluid. It should be treated with over-the-counter medications such as Miralax or suppositories, and/or Fleets enema. You should have a bowel movement at least every other day following surgery. Incision care:  Keep this area clean and dry. DO NOT rub the incision. DO NOT take a tub bath or go swimming until cleared by your doctor. DO NOT apply lotions, oils, or creams to incision. Keep covered with a dry dressing until your follow up visit with Dr. Aparna Dumas. To increase and promote healing:  Stop Smoking (or at least cut back on smoking).   Eat a well-balanced diet (high in protein and vitamin C)  If your appetite is poor, consider nutritional supplements like Ensure, Glucerna, or [place] Instant Breakfast.  If you are diabetic, controlling you blood sugars is very important to prevent infection and promote wound healing. Nutrition:  If you were on a supplement such as Ensure or Glucerna) while in the hospital, please continue using them with each meal for the next 30 days. Eat a well-balanced diet - High in protein, high in vitamins and minerals, especially vitamin C and zinc.     Physical Activity:  - NO DRIVING until told to do so. Total Shoulder Protocol:  1. Wrist, hand, and elbow range of motion. 2.  Active assist with elevation as tolerated. 3.  Ambulate in Hallway  4. Only restriction is No external rotation past 30 degrees  5. Sling is optional      Warning Signs: Please call your physician immediately at [phone] if you have:  Bleeding from incision that is constant. Change in mental status (unusual behavior or confusion)  If your incision develops redness or swelling  Change in wound drainage (increase in amount, color, or foul odor)  Temperature over 101.5 degrees Fahrenheit   Pain, Tenderness, or redness in the calf of your leg  Increased swelling of the thigh, ankle, calf, or foot. Emergency: CALL 911 if you have:  Shortness of breath  Chest pain  Localized chest pain when coughing or taking a deep breath    Follow-up:  Please call Dr. Cecille Brown office for a follow up appointment in 10-14 days at [phone] ext. [phone]  You can return to work when cleared by a physician        TAKE NARCOTIC PAIN MEDICATIONS WITH FOOD! For the night of surgery, while block is still in effect, start with 1 pain pill at bedtime    Narcotics tend to be constipating and we recommend taking a stool softener such as Colace or Miralax (follow package instructions).     If you were given prescriptions, please review the written information on the prescribed medications. DO NOT DRIVE WHILE TAKING NARCOTIC PAIN MEDICATIONS. CPAP PATIENTS BE SURE TO WEAR MACHINE WHENEVER NAPPING OR SLEEPING DAY/NIGHT OF SURGERY! DISCHARGE SUMMARY from Nurse    The following personal items collected during your admission are returned to you:   Dental Appliance: Dental Appliances: None  Vision: Visual Aid: Glasses  Hearing Aid:    Jewelry: Jewelry: None  Clothing: Clothing: With patient  Other Valuables: Other Valuables: Eyeglasses (pacu)  Valuables sent to safe:        PATIENT INSTRUCTIONS:    After General Anesthesia or Intravenous Sedation, for 24 hours or while taking prescription Narcotics:  · Someone should be with you for the next 24 hours. · For your own safety, a responsible adult must drive you home. · Limit your activities  · Recommended activity: Rest today, up with assistance today. Do not climb stairs or shower unattended for the next 24 hours. · Start with a soft bland diet and advance as tolerated (no nausea) to regular diet. · If you have a sore throat some things that may help are: fluids, warm salt water gargle, or throat lozenges. If this does not improve after several days please follow up with your family physician. · Do not drive and operate hazardous machinery  · Do not make important personal or business decisions  · Do  not drink alcoholic beverages  · If you have not urinated within 8 hours after discharge, please contact your surgeon on call.       Report the following to your surgeon:  · Excessive pain, swelling, redness or odor of or around the surgical area  · Temperature over 100.5  · Nausea and vomiting lasting longer than 4 hours or if unable to take medications  · Any signs of decreased circulation or nerve impairment to extremity: change in color, persistent  numbness, tingling, coldness or increase pain    · If you received an upper extremity nerve block, please wear your sling until the block has worn off, then refer to your surgeons post-operative instructions. If you have had a shoulder block or a block near your collar bone, you may have              symptoms such as:          1. Mild shortness of breath        2. A hoarse voice        3. Blurry vision        4. Unequal pupils        5. Drooping of your face on the same side as the nerve block. These symptoms will disappear as the nerve block wears off.    · You will receive a Post Operative Call from one of the Recovery Room Nurses on the day after your surgery to check on you. It is very important for us to know how you are recovering after your surgery. If you have an issue please call your surgeon, do not wait for the post operative call. · You may receive an e-mail or letter in the mail from [employer] regarding your experience with us in the Ambulatory Surgery Unit. Your feedback is valuable to us and we appreciate your participation in the survey. ·   · If the above instructions are not adequate or you are having problems after your surgery, call your physician at their office number. ·   · We wish youre a speedy recovery ? What to do at Home:    *  Please give a list of your current medications to your Primary Care Provider. *  Please update this list whenever your medications are discontinued, doses are      changed, or new medications (including over-the-counter products) are added. *  Please carry medication information at all times in case of emergency situations. If you have not had your influenza or pneumococcal vaccines, please follow up with your primary care physician. The discharge information has been reviewed with the patient and caregiver. The patient and caregiver verbalized understanding.

## 2022-04-13 NOTE — ANESTHESIA POSTPROCEDURE EVALUATION
Procedure(s):  RIGHT SHOULDER TOTAL SHOULDER ARTHROPLASTY. general, regional    Anesthesia Post Evaluation      Multimodal analgesia: multimodal analgesia used between 6 hours prior to anesthesia start to PACU discharge  Patient location during evaluation: PACU  Patient participation: complete - patient participated  Level of consciousness: awake and alert  Pain management: satisfactory to patient  Airway patency: patent  Anesthetic complications: no  Cardiovascular status: acceptable  Respiratory status: acceptable  Hydration status: acceptable  Comments: Some pain in axilla/ chest wall, an area not covered by block. Treated. Sling postop.   23h admission.   Post anesthesia nausea and vomiting:  none  Final Post Anesthesia Temperature Assessment:  Normothermia (36.0-37.5 degrees C)      INITIAL Post-op Vital signs:   Vitals Value Taken Time   /97 04/13/22 1445   Temp 36.7 °C (98 °F) 04/13/22 1432   Pulse 62 04/13/22 1532   Resp 12 04/13/22 1532   SpO2 94 % 04/13/22 1532

## 2022-04-13 NOTE — OP NOTES
OPERATIVE REPORT - TOTAL SHOULDER ARTHROPLASTY      DATE OF SURGERY: 4/13/2022    PREOPERATIVE DIAGNOSIS: RIGHT SHOULDER OSTEOARTHRITIS  Painful osteoarthritis of the right shoulder. POSTOPERATIVE DIAGNOSIS: RIGHT SHOULDER OSTEOARTHRITIS  Painful osteoarthritis of the right shoulder. PROCEDURE: right total shoulder arthoplasty    SURGEON: Mitch Negro DO  Assistant:  FRANKI [facility] staff    ANESTHESIA:  General with nerve block    ESTIMATED BLOOD LOSS:  200 mL. COMPLICATIONS:  none    SPECIMENS: none    DISPOSITION:  Stable to PACU    IMPLANTS:   Implant Name Type Inv. Item Serial No.  Lot No. LRB No. Used Action   CEMENT BNE 40GM W/ GENT HI VISC CO - SNA  CEMENT BNE 40GM W/ GENT HI VISC CO NA [facility] - DJO SURGICAL_WD [id number] Right 1 Implanted   CAGE [employer] [facility] ALPHA Shahid Fry - [id number]  [address] [place] [address] INC_WD NA Right 1 Implanted   STEM HUM DIA9MM SHLDR INDU PRESSFIT EQUINOX[employer] - [id number]  STEM HUM DIA9MM SHLDR INDU PRESSFIT EQUINOXE [id number] [facility] INC_WD NA Right 1 Implanted   SCREW BNE AD PED L47MM [id number] CANC SHLDR NONLOCKING - [id number]  SCREW BNE AD PED L47MM [id number] CANC SHLDR NONLOCKING [id number] [employer] INC_WD NA Right 1 Implanted   PLATE BNE 8.1ZL RICH REPLICATOR O/S EQUINOXE - [id number]  PLATE BNE 9.8GG RICH REPLICATOR O/S EQUINOXE [id number] [employer] INC_WD NA Right 1 Implanted   HEAD HUM [id number] SHT SHLDR FOR HEMIARTHROPLASTY EQUINOXE - [id number]  HEAD HUM [id number] SHT SHLDR FOR HEMIARTHROPLASTY EQUINOXE [id number] [employer] INC_WD NA Right 1 Implanted       FINDINGS:  The rotator cuff tendon was intact. The humeral head was completely devoid of articular cartilage but had no significant deformity. The glenoid had no articular cartilage. At the completion of the arthroplasty, I could easily rotate the forearm across the abdomen and I could abduct the arm to about 90 degrees [facility]).     [employer] GPS system was used to guide in placement of glenoid component. OPERATION: Time out was done to confirm the operating procedure, surgeon, patient and site. Once confirmed by the team, procedure was started. In the operating room the patient was anesthetized and given IV antibiotics. In a modified beachchair position, the right shoulder was prepped and draped in a sterile fashion. A deltopectoral incision was made through the subcutaneous layer and the deltopectoral interval developed with cephalic vein retracted laterally. The subscapularis was transected near the musculotendinous junction and tagged for later closure. It was  from the underlying capsule, which was excised anteriorly and inferiorly. The cuff was freed from adhesions with finger dissection. The head was dislocated and the proximal cutting guide was used to transect the head and peripheral osteophytes were removed. The medullary canal reamed at 13 mm where excellent cortical interferem fit was noted. We then broached to this size. Protective base plate was placed over the humerus as we turned our attention to the glenoid. The subscapularis was freed circumferentially. It had excellent mobility. The glenoid was exposed and a central drill hole made. I was able to over ream to the appropriate size and depth to match the glenoid. Guide was used to drill accessory peg holes. After appropriate preparation of the glenoid face size medium glenoid component was placed without difficulty. Cement was placed in the peripheral peg holes and autogenous bone graft was placed in our central peg hole. We then turned our attention back to the humerus. Our trial components were placed and we trialed with a replicator plate and size 44 tall humeral head component. Trial reductions were carried out. These sizes gave us excellent restoration of overall fit and stability. The sizes were chosen for the final implants.   The final humeral stem, replicator plate, and head component were placed without difficulty. We confirmed appropriate range of motion and stability with the final components in place. The subscapularis was reapproximated with simple interrupted and horizontal mattress #5 Ethibond sutures. The deltopectoral interval was reapproximated with 0 Vicryl. Subcutaneous tissue was closed with 2-0 Vicryl and skin was closed with 4-0 Monocryl suture. Surgical glue was placed followed by a sterile dressing. The patient tolerated the procedure well. We applied a shoulder sling. The patient left for the recovery room in good condition.            Terra Garcia DO

## 2022-04-13 NOTE — PROGRESS NOTES
End of Shift Note    Bedside shift change report given to Lizbet JONES and Bethany Liz (oncoming nurse) by Jesus Alberto Alberts RN (offgoing nurse). Report included the following information SBAR, Kardex and Intake/Output    Shift worked:  7am-7pm     Shift summary and any significant changes:     Ms. Andrei Cintron is voiding well and walking to the bathroom well. Pain is controlled. Concerns for physician to address:  N/A   Zone phone for oncoming shift:   N/A     Activity:  Activity Level: Up with Assistance  Number times ambulated in hallways past shift: 0  Number of times OOB to chair past shift: 0    Cardiac:   Cardiac Monitoring: No      Cardiac Rhythm: Sinus Rhythm    Access:   Current line(s): PIV     Genitourinary:   Urinary status: voiding    Respiratory:   O2 Device: Nasal cannula  Chronic home O2 use?: NO  Incentive spirometer at bedside: YES       GI:  Last Bowel Movement Date: 04/13/22  Current diet:  ADULT DIET Regular  Passing flatus: YES  Tolerating current diet: YES       Pain Management:   Patient states pain is manageable on current regimen: YES    Skin:  Janes Score: 21  Interventions: speciality bed, increase time out of bed, PT/OT consult and nutritional support     Patient Safety:  Fall Score:  Total Score: 3  Interventions: bed/chair alarm, assistive device (walker, cane, etc), gripper socks and pt to call before getting OOB  High Fall Risk: Yes    Length of Stay:  Expected LOS: - - -  Actual LOS: 0      Jesus Alberto Alberts RN

## 2022-04-13 NOTE — ANESTHESIA PROCEDURE NOTES
Peripheral Block    Start time: 4/13/2022 11:28 AM  End time: 4/13/2022 11:40 AM  Performed by: Yonathan Hsieh MD  Authorized by: Yonathan Hsieh MD       Pre-procedure: Indications: at surgeon's request and post-op pain management    Preanesthetic Checklist: patient identified, risks and benefits discussed, site marked, timeout performed, anesthesia consent given and patient being monitored    Timeout Time: 11:30 EDT          Block Type:   Block Type:   Interscalene  Laterality:  Right  Monitoring:  Standard ASA monitoring, responsive to questions and oxygen  Injection Technique:  Single shot  Procedures: ultrasound guided    Patient Position: supine  Prep: chlorhexidine    Location:  Interscalene  Needle Type:  Stimuplex  Needle Gauge:  22 G  Needle Localization:  Ultrasound guidance  Medication Injected:  Bupivacaine liposome (PF) susp (EXPAREL) infiltration, 10 mL  bupivacaine (PF) (MARCAINE) 0.5% injection, 15 mL  Med Admin Time: 4/13/2022 11:35 AM    Assessment:  Number of attempts:  1  Injection Assessment:  Incremental injection every 5 mL, no paresthesia, ultrasound image on chart, local visualized surrounding nerve on ultrasound, negative aspiration for blood and no intravascular symptoms  Patient tolerance:  Patient tolerated the procedure well with no immediate complications

## 2022-04-13 NOTE — BRIEF OP NOTE
Brief Postoperative Note    Patient: Hari Vieira  YOB: 1950  MRN: [id number]    Date of Procedure: 4/13/2022     Pre-Op Diagnosis: RIGHT SHOULDER OSTEOARTHRITIS    Post-Op Diagnosis: Same as preoperative diagnosis. Procedure(s):  RIGHT SHOULDER TOTAL SHOULDER ARTHROPLASTY    Surgeon(s):  Lynne Rogers DO    Surgical Assistant: Surg Asst-1: Christine Davila    Anesthesia: General     Estimated Blood Loss (mL): 698     Complications: None    Specimens: * No specimens in log *     Implants:   Implant Name Type Inv.  Item Serial No.  Lot No. LRB No. Used Action   CEMENT BNE 40GM W/ GENT HI VISC CO - SNA  CEMENT BNE 40GM W/ GENT HI VISC CO NA [employer]_WD [id number] Right 1 Implanted   CAGE Marisela [facility] ALPHA [employer] - [id number]  CAGE [address] [id number] [facility] INC_WD NA Right 1 Implanted   STEM HUM DIA9MM SHLDR INDU PRESSFIT [employer] - [id number]  STEM HUM DIA9MM SHLDR INDU PRESSFIT [employer] [id number] [facility] INC_WD NA Right 1 Implanted   SCREW BNE AD PED L47MM [id number] CANC SHLDR NONLOCKING - [id number]  SCREW BNE AD PED L47MM [id number] CANC SHLDR NONLOCKING [id number] [employer]_WD NA Right 1 Implanted   PLATE BNE 4.2IG RICH REPLICATOR O/S [employer] - [id number]  PLATE BNE 4.5RM RICH REPLICATOR O/S [employer] [id number] [employer]_WD NA Right 1 Implanted   HEAD HUM [id number] SHT SHLDR FOR HEMIARTHROPLASTY [employer] - [id number]  HEAD HUM [id number] SHT SHLDR FOR HEMIARTHROPLASTY [employer] [id number] [employer]_WD NA Right 1 Implanted       Drains: * No LDAs found *    Findings: OA    Electronically Signed by Dominguez Torres DO on 4/13/2022 at 2:11 PM

## 2022-04-13 NOTE — PERIOP NOTES
Permission received to review discharge instructions and discuss private health information with  and will have someone with them after discharge   Patient states that family/friend will be with them for at least 24 hours following today's procedure. Air Warming blanket placed on pt; turned on for comfort        Dr. Amaury Weiss performed nerve block in preop using ultrasound machine to RUE. Pt on CM x3, 02 by NC at 3L, patient responsive when spoken to. Able to answer questions appropriately. Pt did receive 2mg Versed given by Dr. Amaury Weiss for sedation. Pt tolerated procedure well.  VSS and will continue to monitor

## 2022-04-13 NOTE — PERIOP NOTES
Fabien Quintin  1950  [id number]    Situation:  Verbal report given from: RN and CRNA  Procedure: Procedure(s):  RIGHT SHOULDER TOTAL SHOULDER ARTHROPLASTY    Background:    Preoperative diagnosis: RIGHT SHOULDER OSTEOARTHRITIS    Postoperative diagnosis: RIGHT SHOULDER OSTEOARTHRITIS    :  Dr. Leslie Harrell    Assistant(s): Circ-1: Brice Hyatt RN  Scrub Tech-1: Cheri Kulkarni  Surg Asst-1: Micaela Hardin    Specimens: * No specimens in log *    Assessment:  Intra-procedure medications   Propofol  mg      Anesthesia gave intra-procedure sedation and medications, see anesthesia flow sheet     Intravenous fluids: LR@ KVO     Vital signs stable RUE elevated on pillow and positioned for safety        Recommendation:    Permission to share finding with  yes    All side rails up, bed in low position, wheels locked. Nurse at bedside.

## 2022-04-13 NOTE — PROGRESS NOTES
TRANSFER - IN REPORT:    Verbal report received from Subha(name) on Magali Bojorquez  being received from ASU(unit) for routine post - op      Report consisted of patients Situation, Background, Assessment and   Recommendations(SBAR). Information from the following report(s) SBAR and Kardex was reviewed with the receiving nurse. Opportunity for questions and clarification was provided. Assessment completed upon patients arrival to unit and care assumed.

## 2022-04-13 NOTE — PERIOP NOTES
TRANSFER - OUT REPORT:    Verbal report given to Tiana Alex RN(name) on Latesha Turner  being transferred to Orthor(unit) for routine progression of care       Report consisted of patients Situation, Background, Assessment and   Recommendations(SBAR). Information from the following report(s) SBAR, OR Summary, Intake/Output and MAR was reviewed with the receiving nurse. Opportunity for questions and clarification was provided. Patient transported with:   O2 @ 2 liters  Registered Nurse with arm in sling    1730 Pt tolerated transfer well. RN in room and placed on NC 2L and continuous pulse oximetry. Assured has oral 0xycodone order since had 1000mg Acetaminophen. Clothes left in room and  took out incentive spirometer for pt to start.

## 2022-04-14 VITALS
TEMPERATURE: 97.6 F | HEART RATE: 75 BPM | DIASTOLIC BLOOD PRESSURE: 55 MMHG | BODY MASS INDEX: 33.73 KG/M2 | RESPIRATION RATE: 18 BRPM | OXYGEN SATURATION: 93 % | WEIGHT: 209 LBS | SYSTOLIC BLOOD PRESSURE: 95 MMHG

## 2022-04-14 LAB
ANION GAP SERPL CALC-SCNC: 4 MMOL/L (ref 5–15)
BUN SERPL-MCNC: 24 MG/DL (ref 6–20)
BUN/CREAT SERPL: 20 (ref 12–20)
CALCIUM SERPL-MCNC: 8.4 MG/DL (ref 8.5–10.1)
CHLORIDE SERPL-SCNC: 106 MMOL/L (ref 97–108)
CO2 SERPL-SCNC: 29 MMOL/L (ref 21–32)
CREAT SERPL-MCNC: 1.19 MG/DL (ref 0.55–1.02)
GLUCOSE BLD STRIP.AUTO-MCNC: 117 MG/DL (ref 65–117)
GLUCOSE BLD STRIP.AUTO-MCNC: 97 MG/DL (ref 65–117)
GLUCOSE SERPL-MCNC: 133 MG/DL (ref 65–100)
HGB BLD-MCNC: 12.1 G/DL (ref 11.5–16)
MAGNESIUM SERPL-MCNC: 1.8 MG/DL (ref 1.6–2.4)
PHOSPHATE SERPL-MCNC: 3.7 MG/DL (ref 2.6–4.7)
POTASSIUM SERPL-SCNC: 5.1 MMOL/L (ref 3.5–5.1)
SERVICE CMNT-IMP: NORMAL
SERVICE CMNT-IMP: NORMAL
SODIUM SERPL-SCNC: 139 MMOL/L (ref 136–145)

## 2022-04-14 PROCEDURE — 74011000250 HC RX REV CODE- 250: Performed by: ORTHOPAEDIC SURGERY

## 2022-04-14 PROCEDURE — 84100 ASSAY OF PHOSPHORUS: CPT

## 2022-04-14 PROCEDURE — 97165 OT EVAL LOW COMPLEX 30 MIN: CPT | Performed by: OCCUPATIONAL THERAPIST

## 2022-04-14 PROCEDURE — 85018 HEMOGLOBIN: CPT

## 2022-04-14 PROCEDURE — 97530 THERAPEUTIC ACTIVITIES: CPT | Performed by: OCCUPATIONAL THERAPIST

## 2022-04-14 PROCEDURE — 74011250637 HC RX REV CODE- 250/637: Performed by: ORTHOPAEDIC SURGERY

## 2022-04-14 PROCEDURE — 94760 N-INVAS EAR/PLS OXIMETRY 1: CPT

## 2022-04-14 PROCEDURE — 74011250636 HC RX REV CODE- 250/636: Performed by: ORTHOPAEDIC SURGERY

## 2022-04-14 PROCEDURE — 80048 BASIC METABOLIC PNL TOTAL CA: CPT

## 2022-04-14 PROCEDURE — 83735 ASSAY OF MAGNESIUM: CPT

## 2022-04-14 PROCEDURE — 82962 GLUCOSE BLOOD TEST: CPT

## 2022-04-14 PROCEDURE — 77010033678 HC OXYGEN DAILY

## 2022-04-14 PROCEDURE — 36415 COLL VENOUS BLD VENIPUNCTURE: CPT

## 2022-04-14 PROCEDURE — G0378 HOSPITAL OBSERVATION PER HR: HCPCS

## 2022-04-14 RX ORDER — POLYETHYLENE GLYCOL 3350 17 G/17G
17 POWDER, FOR SOLUTION ORAL
Qty: 15 PACKET | Refills: 0 | Status: SHIPPED | OUTPATIENT
Start: 2022-04-14 | End: 2022-04-29

## 2022-04-14 RX ORDER — AMOXICILLIN 250 MG
1 CAPSULE ORAL DAILY
Qty: 30 TABLET | Refills: 0 | Status: SHIPPED | OUTPATIENT
Start: 2022-04-14

## 2022-04-14 RX ADMIN — ACETAMINOPHEN 1000 MG: 500 TABLET ORAL at 06:05

## 2022-04-14 RX ADMIN — GLIPIZIDE 5 MG: 5 TABLET ORAL at 09:42

## 2022-04-14 RX ADMIN — FAMOTIDINE 20 MG: 20 TABLET ORAL at 09:43

## 2022-04-14 RX ADMIN — SODIUM CHLORIDE, PRESERVATIVE FREE 10 ML: 5 INJECTION INTRAVENOUS at 15:03

## 2022-04-14 RX ADMIN — TRIAMTERENE AND HYDROCHLOROTHIAZIDE 1 TABLET: 37.5; 25 TABLET ORAL at 09:44

## 2022-04-14 RX ADMIN — OXYCODONE 10 MG: 5 TABLET ORAL at 09:43

## 2022-04-14 RX ADMIN — ESCITALOPRAM OXALATE 10 MG: 10 TABLET ORAL at 09:42

## 2022-04-14 RX ADMIN — ACETAMINOPHEN 1000 MG: 500 TABLET ORAL at 11:36

## 2022-04-14 RX ADMIN — SENNOSIDES AND DOCUSATE SODIUM 1 TABLET: 50; 8.6 TABLET ORAL at 09:43

## 2022-04-14 RX ADMIN — SODIUM CHLORIDE, PRESERVATIVE FREE 10 ML: 5 INJECTION INTRAVENOUS at 06:05

## 2022-04-14 RX ADMIN — ALLOPURINOL 300 MG: 300 TABLET ORAL at 09:43

## 2022-04-14 RX ADMIN — APIXABAN 5 MG: 5 TABLET, FILM COATED ORAL at 09:44

## 2022-04-14 RX ADMIN — SOTALOL HYDROCHLORIDE 120 MG: 80 TABLET ORAL at 11:38

## 2022-04-14 RX ADMIN — ATORVASTATIN CALCIUM 20 MG: 20 TABLET, FILM COATED ORAL at 09:42

## 2022-04-14 RX ADMIN — OXYCODONE 10 MG: 5 TABLET ORAL at 03:13

## 2022-04-14 RX ADMIN — CEFAZOLIN SODIUM 2 G: 1 INJECTION, POWDER, FOR SOLUTION INTRAMUSCULAR; INTRAVENOUS at 03:13

## 2022-04-14 RX ADMIN — OXYCODONE 10 MG: 5 TABLET ORAL at 06:05

## 2022-04-14 NOTE — DISCHARGE SUMMARY
Ortho Discharge Summary    Patient ID:  Latesha Turner  [id number]  female  66 y.o.  1950    Admit date: 4/13/2022    Discharge date: 4/14/2022    Admitting Physician: Manjinder Lott DO     Consulting Physician(s):   Treatment Team: Attending Provider: Megan Sosa DO; Utilization Review: Romana Mounts, RN; Primary Nurse: Ester Hernandez RN; Occupational Therapist: Reinier Elise OTR/HAILY    Date of Surgery:   4/13/2022     Preoperative Diagnosis:  RIGHT SHOULDER OSTEOARTHRITIS    Postoperative Diagnosis:   RIGHT SHOULDER OSTEOARTHRITIS    Procedure(s):     RIGHT SHOULDER TOTAL SHOULDER ARTHROPLASTY     Anesthesia Type:   General     Surgeon: Megan Sosa DO                            HPI:  Pt is a 66 y.o. female who has a history of RIGHT SHOULDER OSTEOARTHRITIS  with pain and limitations of activities of daily living who presents at this time for a right TSA following the failure of conservative management. PMH:   Past Medical History:   Diagnosis Date    Adverse effect of anesthesia     hard to wake    Arrhythmia 2016    AFIB - CARDIOVERSION TO CORRECT    Arthritis     Cancer ([facility] 2015    BILATERAL BREAST CANCER    Diabetes ([facility]     DM2    Endocarditis 2014    HOSPITALIZED    Hypertension     Ill-defined condition     KIDNEY STONES    PONV (postoperative nausea and vomiting)     Psychiatric disorder     DEPRESSION    Sleep apnea     no cpap       Body mass index is 33.73 kg/m². : A BMI > 30 is classified as obesity and > 40 is classified as morbid obesity. Medications upon admission :   Prior to Admission Medications   Prescriptions Last Dose Informant Patient Reported? Taking? Accu-Chek Fastclix Lancet Drum misc   Yes No   allopurinol (ZYLOPRIM) 300 mg tablet 4/12/2022 at Unknown time  Yes Yes   Sig: Take 300 mg by mouth daily. apixaban (ELIQUIS) 5 mg tablet 4/10/2022  Yes No   Sig: Take 5 mg by mouth two (2) times a day.    atorvastatin (LIPITOR) 20 mg tablet 4/13/2022 at Unknown time  Yes Yes   Sig: Take 20 mg by mouth daily. escitalopram oxalate (LEXAPRO) 10 mg tablet 4/13/2022 at Unknown time  Yes Yes   Sig: Take 10 mg by mouth daily. glipiZIDE (GLUCOTROL) 5 mg tablet 4/12/2022 at Unknown time  Yes Yes   Sig: Take 5 mg by mouth two (2) times a day. metFORMIN ER (GLUCOPHAGE XR) 500 mg tablet 4/12/2022 at Unknown time  Yes Yes   Sig: Take 500 mg by mouth daily (with dinner). omega-3 fatty acids/fish oil (FISH OIL EXTRA STRENGTH PO) 4/7/2022  Yes No   Sig: Take 1 Tab by mouth daily. omeprazole (PRILOSEC) 40 mg capsule 4/12/2022 at Unknown time  Yes Yes   Sig: Take 40 mg by mouth daily. sotalol (BETAPACE) 120 mg tablet 4/12/2022 at 1830  Yes Yes   Sig: Take 120 mg by mouth two (2) times a day. triamterene-hydroCHLOROthiazide (MAXZIDE) 37.5-25 mg per tablet 4/13/2022 at Unknown time  Yes Yes   Sig: Take 1 Tablet by mouth daily. Facility-Administered Medications: None        Allergies:  No Known Allergies     Hospital Course: The patient underwent surgery. Complications:  None; patient tolerated the procedure well. Was taken to the PACU in stable condition and then transferred to the ortho floor. Perioperative Antibiotics:  Ancef     Postoperative Pain Management:  Oxycodone      DVT Prophylaxis: Eliquis    Postoperative transfusions:    Number of units banked PRBCs =   none     Post Op complications: none    Hemoglobin at discharge:    Lab Results   Component Value Date/Time    HGB 12.1 04/14/2022 03:19 AM    INR 1.0 04/05/2022 03:09 PM       Dressing remained clean, dry and intact. No significant erythema or swelling. Neurovascular exam found to be within normal limits. Wound appears to be healing without any evidence of infection. Physical Therapy started following surgery and participated in bed mobility, transfers and ambulation. Discharged to: Home.     Condition on Discharge:   stable    Discharge instructions:  - Anticoagulate with Eliquis  - Take pain medications as prescribed  - Resume pre hospital diet      - Discharge activity: activity as tolerated  - Ambulate with assistive device as needed. - Wound Care Keep wound clean and dry. See discharge instruction sheet. -DISCHARGE MEDICATION LIST     Current Discharge Medication List      START taking these medications    Details   polyethylene glycol (MIRALAX) 17 gram packet Take 1 Packet by mouth daily as needed (constipation) for up to 15 days. Qty: 15 Packet, Refills: 0  Start date: 4/14/2022, End date: 4/29/2022      senna-docusate (PERICOLACE) 8.6-50 mg per tablet Take 1 Tablet by mouth daily. Qty: 30 Tablet, Refills: 0  Start date: 4/14/2022      oxyCODONE IR (Roxicodone) 5 mg immediate release tablet Take 1 Tablet by mouth every six (6) hours as needed for Pain for up to 7 days. Max Daily Amount: 20 mg.  Qty: 28 Tablet, Refills: 0  Start date: 4/13/2022, End date: 4/20/2022    Associated Diagnoses: Primary osteoarthritis of right shoulder         CONTINUE these medications which have NOT CHANGED    Details   escitalopram oxalate (LEXAPRO) 10 mg tablet Take 10 mg by mouth daily. omeprazole (PRILOSEC) 40 mg capsule Take 40 mg by mouth daily. triamterene-hydroCHLOROthiazide (MAXZIDE) 37.5-25 mg per tablet Take 1 Tablet by mouth daily. metFORMIN ER (GLUCOPHAGE XR) 500 mg tablet Take 500 mg by mouth daily (with dinner). allopurinol (ZYLOPRIM) 300 mg tablet Take 300 mg by mouth daily. atorvastatin (LIPITOR) 20 mg tablet Take 20 mg by mouth daily. glipiZIDE (GLUCOTROL) 5 mg tablet Take 5 mg by mouth two (2) times a day. sotalol (BETAPACE) 120 mg tablet Take 120 mg by mouth two (2) times a day. Accu-Chek Fastclix Lancet Drum misc       apixaban (ELIQUIS) 5 mg tablet Take 5 mg by mouth two (2) times a day. omega-3 fatty acids/fish oil (FISH OIL EXTRA STRENGTH PO) Take 1 Tab by mouth daily.           per medical continuation form      -Follow up in office in 2 weeks      Signed:  Nathan Irene.  Sonja Aparicio, VAIBHAVP-BC, ONP-C  Orthopaedic Nurse Practitioner    4/14/2022  1:50 PM

## 2022-04-14 NOTE — PROGRESS NOTES
Occupational Therapy  Orders received and medical record reviewed. Upon arrival, pt was seated in chair reporting that she'd just returned from the bathroom, /44. Pt reports that she feels (and looks) sleepy. Pt is known to this therapist from her shoulder prehab appt on 4/5/2022--pt recalls this. Pt wishes to eat breakfast prior to initiating OT Evaluation. Will defer at this time and follow up.

## 2022-04-14 NOTE — PROGRESS NOTES
End of Shift Note    Bedside shift change report given to [address] (oncoming nurse) by Tonny James RN (offgoing nurse). Report included the following information SBAR, Kardex, OR Summary, Procedure Summary, Intake/Output, MAR, Recent Results and Med Rec Status    Shift worked:  7p-7a     Shift summary and any significant changes:          Concerns for physician to address:       Zone phone for oncoming shift:   [phone]       Activity:  Activity Level: Up with Assistance  Number times ambulated in hallways past shift: 0, ambulated in room  Number of times OOB to chair past shift: 0    Cardiac:   Cardiac Monitoring: No      Cardiac Rhythm: Sinus Rhythm    Access:   Current line(s): PIV     Genitourinary:   Urinary status: voiding    Respiratory:   O2 Device: Nasal cannula  Chronic home O2 use?: NO  Incentive spirometer at bedside: YES       GI:  Last Bowel Movement Date: 04/13/22  Current diet:  ADULT DIET Regular  Passing flatus: YES  Tolerating current diet: YES       Pain Management:   Patient states pain is manageable on current regimen: YES    Skin:  Janes Score: 19  Interventions: float heels, increase time out of bed and PT/OT consult    Patient Safety:  Fall Score:  Total Score: 3  Interventions: assistive device (walker, cane, etc), gripper socks, pt to call before getting OOB and stay with me (per policy)  High Fall Risk: Yes    Length of Stay:  Expected LOS: - - -  Actual LOS: 0      Lizbet Lackey, RN

## 2022-04-14 NOTE — PROGRESS NOTES
Problem: Falls - Risk of  Goal: *Absence of Falls  Description: Document Danni Gold Fall Risk and appropriate interventions in the flowsheet.   Outcome: Progressing Towards Goal  Note: Fall Risk Interventions:  Mobility Interventions: Bed/chair exit alarm,Communicate number of staff needed for ambulation/transfer,Patient to call before getting OOB,Utilize walker, cane, or other assistive device         Medication Interventions: Bed/chair exit alarm,Patient to call before getting OOB,Teach patient to arise slowly    Elimination Interventions: Bed/chair exit alarm,Call light in reach,Patient to call for help with toileting needs,Toileting schedule/hourly rounds              Problem: Patient Education: Go to Patient Education Activity  Goal: Patient/Family Education  Outcome: Progressing Towards Goal

## 2022-04-14 NOTE — PROGRESS NOTES
OCCUPATIONAL THERAPY EVALUATION/DISCHARGE  Patient: Alexx Batista (38 y.o. female)  Date: 4/14/2022  Primary Diagnosis: Osteoarthritis of right shoulder [M19.011]  Procedure(s) (LRB):  RIGHT SHOULDER TOTAL SHOULDER ARTHROPLASTY (Right) 1 Day Post-Op   Precautions: non WB R UE/ not ext rotation R shoulder past neutral, AAROM R shoulder       ASSESSMENT  Based on the objective data described below, the patient presents with drowsiness, but recall prehab education appointment and exercises, etc in presurgical handouts. Pt's  was present and he will be assisting her at home. He was present at prehab and during OT Evaluation this session. Pt was able to perform mobility without assistance to the bathroom and bed to chair. She has questions re: her sling, and reeducation provided, but  will assist.  Pt tolerated supine RUE exercise in forward flexion assisted, and AAROM elbow, wrist and hand. Pt is able to perform hand exercises, but is generally numb in her RUE. REviewed all precautions and pt and  verbalized understanding. Pt's BP running on the low side and pt on 1.5L O2 nasal cannula--nurse informed. Pt very drowsy/sleepy and was left to rest in supine, shoulder supported on pillow appropriately and with sling on. No further OT needs at this time. No further questions from pt nor from . Current Level of Function (ADLs/self-care): up to max A for adls, generally independent for adls--pt drowsiness continues post surgery    Functional Outcome Measure: The patient scored 60/100 on the BArthel Index outcome measure which is indicative of \"minimally independent\" functioning for adls. Other factors to consider for discharge: has supportive .   Reports significant drowsiness post previous surgeries     PLAN :  Recommendation for discharge: (in order for the patient to meet his/her long term goals)  No skilled occupational therapy/ follow up rehabilitation needs identified at this time. This discharge recommendation:  Has not yet been discussed the attending provider and/or case management    IF patient discharges home will need the following DME: none       SUBJECTIVE:   Patient stated [facility] will help me.     OBJECTIVE DATA SUMMARY:   HISTORY:   Past Medical History:   Diagnosis Date    Adverse effect of anesthesia     hard to wake    Arrhythmia 2016    AFIB - CARDIOVERSION TO CORRECT    Arthritis     Cancer ([facility] 2015    BILATERAL BREAST CANCER    Diabetes ([facility]     DM2    Endocarditis 2014    HOSPITALIZED    Hypertension     Ill-defined condition     KIDNEY STONES    PONV (postoperative nausea and vomiting)     Psychiatric disorder     DEPRESSION    Sleep apnea     no cpap     Past Surgical History:   Procedure Laterality Date    HX BREAST RECONSTRUCTION Bilateral 2017    HX COLONOSCOPY      HX CYST REMOVAL  1974    HX ENDOSCOPY      HX HEENT      WISDOM TEETH X4    HX HERNIA REPAIR  2018    HX HYSTERECTOMY  1980s    HX MASTECTOMY Bilateral 2016    HX ORTHOPAEDIC  1980s    HAD TWO LUMBAR SURGERIES - PT UNSURE OF PROCEDURE    HX ORTHOPAEDIC  2003    CERVICAL PLATE INSERTED    HX ORTHOPAEDIC  2018    LEFT ANKLE TENDON REPAIR    HX OTHER SURGICAL  11/2021    Ablation of the heart     HX TONSILLECTOMY  CHILDHOOD    HX TUBAL LIGATION         Prior Level of Function/Environment/Context: independent  Expanded or extensive additional review of patient history: multiple joint surgeries    Home Situation  Home Environment: Private residence  # Steps to Enter: 2  One/Two Story Residence: One story  Living Alone: No  Support Systems: Spouse/Significant Other  Patient Expects to be Discharged to[de-identified] Home with family assistance  Current DME Used/Available at Home: Cane, straight  Tub or Shower Type: Shower    Hand dominance: Right    EXAMINATION OF PERFORMANCE DEFICITS:  Cognitive/Behavioral Status:  Neurologic State: Appropriate for age (reports feeling drowsy and declines pendulum exercises)  Orientation Level: Appropriate for age  Cognition: Follows commands  Perception: Appears intact  Perseveration: No perseveration noted  Safety/Judgement: Awareness of environment; Fall prevention    Skin: generally intact, incision dry    Edema: as expected post surgery--provided fresh Ice packs and educated in using     Hearing: Auditory  Auditory Impairment: None  Hearing Aids/Status: Bilateral,At home    Vision/Perceptual:                           Acuity:  (not formally assessed)         Range of Motion:  RUE impaired and in sling. Precautions reviewed with pt--AAROM RUE and no weightbearing and no ext. Rotation shoulder   LUE within functional limits                         Strength:  R impaired  LUE intact                   Coordination:     Fine Motor Skills-Upper: Left Intact; Right Impaired (due to recent surgery--R TSA)    Gross Motor Skills-Upper: Left Intact; Right Impaired    Tone & Sensation: Tone is normal  Sensation is impaired--pt feels numbness in RUE                            Balance:  Sitting: Impaired  Sitting - Static: Good (unsupported)  Sitting - Dynamic:  (not tested as pt feared dizziness if she were to bend forwar)  Standing: Impaired  Standing - Static: Good  Standing - Dynamic : Good (close S/declines bending forward due to possible dizziness)    Functional Mobility and Transfers for ADLs:  Bed Mobility:  Sit to Supine: Independent (set up bed with appropriate pillows)  Scooting: Independent (educ in approp positioning at bed level to decrease need for)    Transfers:  Sit to Stand: Independent  Stand to Sit: Independent  Bed to Chair: Independent    ADL Assessment:  Feeding: Independent    Oral Facial Hygiene/Grooming: Setup    Bathing:  Moderate assistance    Upper Body Dressing: Maximum assistance    Lower Body Dressing: Maximum assistance    Toileting: Minimum assistance                ADL Intervention and task modifications:  Patient instructed and demonstrated shoulder precautions during ADLs with  Cues provided. Patient instructed and indicated Good understanding propping surgical arm on surface, can back away from arm in order to access axilla to wash, dry, and deodorize. Patient instructed and demonstrated dress RUE first/undress last with Maximum assistance. Patient instructed and demonstrated compensatory strategies to don sling with Maximum assistance.  verbalized understanding      Patient stated that she's been practicing adls at home with her sling on and some of her pendulums            Cognitive Retraining  Safety/Judgement: Awareness of environment; Fall prevention    Therapeutic Exercise:    EXERCISE   Sets   Reps   Active Active Assist   Passive   Comments   Shoulder Flexion 1 8 []                          [x]                          []                             Shoulder external rotation   (to neutral) 1 3 []                          [x]                          []                          For adls, don/doff sling   Elbow flexion/extension 1 10 []                          [x]                          []                             Wrist flexion/extension 1 10- []                          [x]                          []                             Finger flexion/extension 1 10 [x]                          []                          []                          Educated on basic hand exercises   Pendulum      []     []     []     Not performed as pt reported that she was too drowsy-offered alternative to standing position as she is fearful of feeling dizzy     Functional Measure:    Barthel Index:  Bathin  Bladder: 10  Bowels: 10  Groomin  Dressin  Feeding: 10  Mobility: 0  Stairs: 0  Toilet Use: 10  Transfer (Bed to Chair and Back): 10  Total: 60/100      The Barthel ADL Index: Guidelines  1. The index should be used as a record of what a patient does, not as a record of what a patient could do.   2. The main aim is to establish degree of independence from any help, physical or verbal, however minor and for whatever reason. 3. The need for supervision renders the patient not independent. 4. A patient's performance should be established using the best available evidence. Asking the patient, friends/relatives and nurses are the usual sources, but direct observation and common sense are also important. However direct testing is not needed. 5. Usually the patient's performance over the preceding 24-48 hours is important, but occasionally longer periods will be relevant. 6. Middle categories imply that the patient supplies over 50 per cent of the effort. 7. Use of aids to be independent is allowed. Score Interpretation (from [address] [facility] 83)    Independent   60-79 Minimally independent   40-59 Partially dependent   20-39 Very dependent   <20 Totally dependent     -Nikunj Garces., Barthel, DSinaW. (1965). Functional evaluation: the Barthel Index. [address] [facility] ([address] [place] [address] (1997). The Barthel activities of daily living index: self-reporting versus actual performance in the old (> or = 75 years). Journal of [address] [place] [address] 45(7), [facility], J.Sina., [facility]., Faraz Holcomb. (1999). Measuring the change in disability after inpatient rehabilitation; comparison of the responsiveness of the Barthel Index and Functional [place] Measure. Journal of Neurology, Neurosurgery, and Psychiatry, 66(4), 502-935. [place], [place]JONATHAN, NAHUN Cerrato, & Mago Pedroza, M.A. (2004) Assessment of post-stroke quality of life in cost-effectiveness studies: The usefulness of the Barthel Index and the EuroQoL-5D.  Quality of Life Research, 15, 230-32     Occupational Therapy Evaluation Charge Determination   History Examination Decision-Making   MEDIUM Complexity : Expanded review of history including physical, cognitive and psychosocial  history  MEDIUM Complexity [place] 3-5 performance deficits relating to physical, cognitive , or psychosocial skils that result in activity limitations and / or participation restrictions MEDIUM Complexity : Patient may present with comorbidities that affect occupational performnce. Miniml to moderate modification of tasks or assistance (eg, physical or verbal ) with assesment(s) is necessary to enable patient to complete evaluation       Based on the above components, the patient evaluation is determined to be of the following complexity level: MEDIUM  Pain Rating:  Pt did not c/o numb her R shoulder is numb    Activity Tolerance:   Fair, requires rest breaks and BP on low side and requires 1.5 L O2 for sats to 94%    After treatment patient left in no apparent distress:    Supine in bed, Call bell within reach, Caregiver / family present and Side rails x 3    COMMUNICATION/EDUCATION:   The patients plan of care was discussed with: Registered nurse.      Thank you for this referral.  Randell Urias OTR/L  Time Calculation: 45 mins

## 2022-04-14 NOTE — PROGRESS NOTES
Ortho / Neurosurgery NP Note    POD# 1  s/p RIGHT SHOULDER TOTAL SHOULDER ARTHROPLASTY   Pt seen with  at bedside    Pt resting in bed. No complaints. Denies SOB but has been requiring O2 overnight and weaning down this morning. VSS Afebrile. Voiding status: + void  Output (mL)  Urine Voided: 500 ml (04/14/22 0313)  Last Bowel Movement Date: 04/13/22 (04/14/22 0809)  Unmeasurable Output  Urine Occurrence(s): 1 (04/13/22 1805)      Labs  Lab Results   Component Value Date/Time    HGB 12.1 04/14/2022 03:19 AM      Lab Results   Component Value Date/Time    INR 1.0 04/05/2022 03:09 PM        Recent Glucose Results:   Lab Results   Component Value Date/Time     (H) 04/14/2022 03:19 AM    GLUCPOC 97 04/14/2022 12:26 PM    GLUCPOC 117 04/14/2022 08:36 AM    GLUCPOC 184 (H) 04/13/2022 09:21 PM         Body mass index is 33.73 kg/m². : A BMI > 30 is classified as obesity and > 40 is classified as morbid obesity. Shoulder Dressing c.d.i  Sling in place  Calves soft and supple; No pain with passive stretch  Sensation and motor intact  SCDs for mechanical DVT proph while in bed  BBS clear      PLAN:  1) PT BID  2) Eliquis 5 mg PO BID for DVT Prophylaxis   3) Wean O2 - as been using incentive spirometer.   Wean down to Room air and has been maintaining sats 94-95%  4) Readniess for discharge:     [x] Vital Signs stable    [x] Hgb stable    [x] + Voiding    [x] Wound intact, drainage minimal    [x] Tolerating PO intake     [x] Cleared by PT (OT if applicable)     [x] Stair training completed (if applicable)    [x] Independent / Contact Guard Assist (household distance)     [x] Bed mobility     [x] Car transfers     [x] ADLs    [x] Adequate pain control on oral medication alone     PLan home today    Palma Beckman, NP  DNP, ACNP-BC, ONP-C

## 2022-04-22 DIAGNOSIS — M19.011 PRIMARY OSTEOARTHRITIS, RIGHT SHOULDER: Primary | ICD-10-CM

## 2022-04-22 RX ORDER — OXYCODONE AND ACETAMINOPHEN 5; 325 MG/1; MG/1
1 TABLET ORAL
Qty: 28 TABLET | Refills: 0 | Status: SHIPPED | OUTPATIENT
Start: 2022-04-22 | End: 2022-04-29

## 2022-04-25 ENCOUNTER — OFFICE VISIT (OUTPATIENT)
Dept: ORTHOPEDIC SURGERY | Age: 72
End: 2022-04-25
Payer: MEDICARE

## 2022-04-25 VITALS — WEIGHT: 209 LBS | HEIGHT: 66 IN | BODY MASS INDEX: 33.59 KG/M2

## 2022-04-25 DIAGNOSIS — Z09 SURGICAL FOLLOW-UP CARE: ICD-10-CM

## 2022-04-25 DIAGNOSIS — M25.511 ACUTE PAIN OF RIGHT SHOULDER: Primary | ICD-10-CM

## 2022-04-25 PROCEDURE — 99024 POSTOP FOLLOW-UP VISIT: CPT | Performed by: ORTHOPAEDIC SURGERY

## 2022-04-25 RX ORDER — CYCLOBENZAPRINE HCL 10 MG
10 TABLET ORAL
Qty: 30 TABLET | Refills: 0 | Status: SHIPPED | OUTPATIENT
Start: 2022-04-25

## 2022-04-25 NOTE — PROGRESS NOTES
Evelyn Dean (: 1950) is a 66 y.o. female, established patient, here for evaluation of the following chief complaint(s):  Shoulder Pain (right shoulder)       ASSESSMENT/PLAN:  Below is the assessment and plan developed based on review of pertinent history, physical exam, labs, studies, and medications. Overall she seems to be doing well. She will continue to progress with her home health physical therapy and we did write a prescription to start outpatient therapy once this finishes. I will plan to see her back in 6 weeks to assess her progress. 1. Acute pain of right shoulder  -     XR SHOULDER RT AP/LAT MIN 2 V; Future  -     REFERRAL TO PHYSICAL THERAPY  -     cyclobenzaprine (FLEXERIL) 10 mg tablet; Take 1 Tablet by mouth nightly as needed for Muscle Spasm(s). , Normal, Disp-30 Tablet, R-0  2. Surgical follow-up care      Return in about 6 weeks (around 2022). SUBJECTIVE/OBJECTIVE:  Evelyn Dean (: 1950) is a 66 y.o. female. She notes that her aching pain in the shoulder has been tolerable since surgery. She does have occasional difficulty sleeping at night. No Known Allergies    Current Outpatient Medications   Medication Sig    cyclobenzaprine (FLEXERIL) 10 mg tablet Take 1 Tablet by mouth nightly as needed for Muscle Spasm(s).  oxyCODONE-acetaminophen (Percocet) 5-325 mg per tablet Take 1 Tablet by mouth every six (6) hours as needed for Pain for up to 7 days. Max Daily Amount: 4 Tablets.  polyethylene glycol (MIRALAX) 17 gram packet Take 1 Packet by mouth daily as needed (constipation) for up to 15 days.  senna-docusate (PERICOLACE) 8.6-50 mg per tablet Take 1 Tablet by mouth daily.  escitalopram oxalate (LEXAPRO) 10 mg tablet Take 10 mg by mouth daily.  Accu-Chek Fastclix Lancet Drum misc     omeprazole (PRILOSEC) 40 mg capsule Take 40 mg by mouth daily.     triamterene-hydroCHLOROthiazide (MAXZIDE) 37.5-25 mg per tablet Take 1 Tablet by mouth daily.  metFORMIN ER (GLUCOPHAGE XR) 500 mg tablet Take 500 mg by mouth daily (with dinner).  allopurinol (ZYLOPRIM) 300 mg tablet Take 300 mg by mouth daily.  atorvastatin (LIPITOR) 20 mg tablet Take 20 mg by mouth daily.  apixaban (ELIQUIS) 5 mg tablet Take 5 mg by mouth two (2) times a day.  glipiZIDE (GLUCOTROL) 5 mg tablet Take 5 mg by mouth two (2) times a day.  omega-3 fatty acids/fish oil (FISH OIL EXTRA STRENGTH PO) Take 1 Tab by mouth daily.  sotalol (BETAPACE) 120 mg tablet Take 120 mg by mouth two (2) times a day. No current facility-administered medications for this visit. Social History     Socioeconomic History    Marital status:      Spouse name: Not on file    Number of children: Not on file    Years of education: Not on file    Highest education level: Not on file   Occupational History    Not on file   Tobacco Use    Smoking status: Never Smoker    Smokeless tobacco: Never Used   Vaping Use    Vaping Use: Never used   Substance and Sexual Activity    Alcohol use: No    Drug use: Never    Sexual activity: Yes     Partners: Male   Other Topics Concern    Not on file   Social History Narrative    Not on file     Social Determinants of Health     Financial Resource Strain:     Difficulty of Paying Living Expenses: Not on file   Food Insecurity:     Worried About Running Out of Food in the Last Year: Not on file    Jaison of Food in the Last Year: Not on file   Transportation Needs:     Lack of Transportation (Medical): Not on file    Lack of Transportation (Non-Medical):  Not on file   Physical Activity:     Days of Exercise per Week: Not on file    Minutes of Exercise per Session: Not on file   Stress:     Feeling of Stress : Not on file   Social Connections:     Frequency of Communication with Friends and Family: Not on file    Frequency of Social Gatherings with Friends and Family: Not on file    Attends Sikhism Services: Not on file   [facility] [employer] or Organizations: Not on file    Attends Club or Organization Meetings: Not on file    Marital Status: Not on file   Intimate Partner Violence:     Fear of Current or Ex-Partner: Not on file    Emotionally Abused: Not on file    Physically Abused: Not on file    Sexually Abused: Not on file   Housing Stability:     Unable to Pay for Housing in the Last Year: Not on file    Number of [place] in the Last Year: Not on file    Unstable Housing in the Last Year: Not on file       Past Surgical History:   Procedure Laterality Date    HX BREAST RECONSTRUCTION Bilateral 2017    HX COLONOSCOPY      HX CYST REMOVAL  1974    HX ENDOSCOPY      HX HEENT      WISDOM TEETH X4    HX HERNIA REPAIR  2018    HX HYSTERECTOMY  1980s    HX MASTECTOMY Bilateral 2016    HX ORTHOPAEDIC  1980s    HAD TWO LUMBAR SURGERIES - PT UNSURE OF PROCEDURE    HX ORTHOPAEDIC  2003    CERVICAL PLATE INSERTED    HX ORTHOPAEDIC  2018    LEFT ANKLE TENDON REPAIR    HX OTHER SURGICAL  11/2021    Ablation of the heart     HX TONSILLECTOMY  CHILDHOOD    HX TUBAL LIGATION         Family History   Problem Relation Age of Onset    Stroke Mother     Diabetes Mother     Heart Disease Father     Stroke Father     Atrial Fibrillation Brother     Anesth Problems Neg Hx         OB History    No obstetric history on file. REVIEW OF SYSTEMS:    Patient denies any recent fever, chills, nausea, vomiting, chest pain, or shortness of breath. Vitals:  Ht 5' 6\" (1.676 m)   Wt 209 lb (94.8 kg)   BMI 33.73 kg/m²    Body mass index is 33.73 kg/m². PHYSICAL EXAM:  General exam: Patient is awake, alert, and oriented x3. Well-appearing. No acute distress. Ambulates with a normal gait. Right shoulder: Neurovascular and sensory intact. Incisions well-healed with no signs of erythema or drainage. Mild swelling and ecchymosis. Decreased range of motion.   Normal stability. IMAGING:    XR Results (most recent):  Results from Appointment encounter on 04/25/22    XR SHOULDER RT AP/LAT MIN 2 V    Narrative  X-rays of the right shoulder 3 views done today show evidence of a well-seated total shoulder prosthesis with no obvious signs of hardware failure or loosening      Results from Hospital Encounter encounter on 04/13/22    XR SHOULDER RT AP/LAT MIN 2 V    Narrative  EXAM: XR SHOULDER RT AP/LAT MIN 2 V    INDICATION: s/p R TSA. COMPARISON: None. FINDINGS: 2 views of the right shoulder demonstrate evidence of a right shoulder  arthroplasty, with satisfactory alignment of the prosthetic elements. The  visible right lung is hypoinspiratory and there are right basilar opacities,  nonspecific. .    Impression  Evidence of right shoulder arthroplasty. Hypoinspiratory right lung  with right basilar opacities may indicate atelectasis. Short-term follow-up  chest x-ray can be considered. Results from [place] encounter on 02/06/19    XR SPINE CERV PA LAT [place] 3 V MAX    Narrative  Compliance only    Indication: Spine fusion. Spot fluoroscopic AP and lateral views of the cervical spine demonstrate  anterior cervical fusion in progress from C5-C7. Impression  IMPRESSION: Anterior cervical fusion in progress. Orders Placed This Encounter    XR SHOULDER RT AP/LAT MIN 2 V     Standing Status:   Future     Number of Occurrences:   1     Standing Expiration Date:   5/25/2022     Order Specific Question:   Reason for Exam     Answer:   Shoulder pain    REFERRAL TO PHYSICAL THERAPY     Referral Priority:   Routine     Referral Type:   PT/OT/ST     Referral Reason:   Specialty Services Required     Number of Visits Requested:   1    cyclobenzaprine (FLEXERIL) 10 mg tablet     Sig: Take 1 Tablet by mouth nightly as needed for Muscle Spasm(s).      Dispense:  30 Tablet     Refill:  0              An electronic signature was used to authenticate this note.  -- Dominguez Torres, DO

## 2022-04-25 NOTE — LETTER
4/25/2022    Patient: Magali Bojorquez   YOB: 1950   Date of Visit: 4/25/2022     Caro Uribe, [place] [address]  Via Fax: [phone]    Dear Caro Uribe DO,      Thank you for referring Ms. Sammie Nieves to [facility] for evaluation. My notes for this consultation are attached. If you have questions, please do not hesitate to call me. I look forward to following your patient along with you.       Sincerely,    Daphney Sam DO

## 2022-05-06 DIAGNOSIS — Z09 SURGICAL FOLLOW-UP CARE: Primary | ICD-10-CM

## 2022-05-06 RX ORDER — OXYCODONE AND ACETAMINOPHEN 5; 325 MG/1; MG/1
1 TABLET ORAL
Qty: 28 TABLET | Refills: 0 | Status: SHIPPED | OUTPATIENT
Start: 2022-05-06 | End: 2022-05-13

## 2022-05-27 DIAGNOSIS — Z09 SURGICAL FOLLOW-UP CARE: Primary | ICD-10-CM

## 2022-05-31 RX ORDER — OXYCODONE AND ACETAMINOPHEN 5; 325 MG/1; MG/1
1 TABLET ORAL
Qty: 28 TABLET | Refills: 0 | Status: SHIPPED | OUTPATIENT
Start: 2022-05-31 | End: 2022-06-07

## 2022-06-20 ENCOUNTER — OFFICE VISIT (OUTPATIENT)
Dept: ORTHOPEDIC SURGERY | Age: 72
End: 2022-06-20
Payer: MEDICARE

## 2022-06-20 VITALS — HEIGHT: 66 IN | WEIGHT: 209 LBS | BODY MASS INDEX: 33.59 KG/M2

## 2022-06-20 DIAGNOSIS — Z09 SURGICAL FOLLOW-UP CARE: Primary | ICD-10-CM

## 2022-06-20 PROCEDURE — 99024 POSTOP FOLLOW-UP VISIT: CPT | Performed by: ORTHOPAEDIC SURGERY

## 2022-06-20 RX ORDER — GABAPENTIN 100 MG/1
CAPSULE ORAL
COMMUNITY
Start: 2022-06-19

## 2022-06-20 RX ORDER — DIGOXIN 125 MCG
TABLET ORAL
COMMUNITY
Start: 2022-05-16

## 2022-06-20 NOTE — PROGRESS NOTES
Kelsey Keyes (: 1950) is a 66 y.o. female, established patient, here for evaluation of the following chief complaint(s):  Shoulder Pain (right shoulder)       ASSESSMENT/PLAN:  Below is the assessment and plan developed based on review of pertinent history, physical exam, labs, studies, and medications. Overall she seems to be progressing well as expected. I will plan to see her back in 2 months and we will repeat x-rays at that time. She will continue with a home exercise regimen as well. 1. Surgical follow-up care  -     XR SHOULDER RT AP/LAT MIN 2 V; Future      Return in about 2 months (around 2022). SUBJECTIVE/OBJECTIVE:  Kelsey Keyes (: 1950) is a 66 y.o. female. She is about 2 months status post right total shoulder arthroplasty. No complaints today. Overall pain is improving although she does continue with some weakness in the shoulder. No Known Allergies    Current Outpatient Medications   Medication Sig    digoxin (LANOXIN) 0.125 mg tablet     gabapentin (NEURONTIN) 100 mg capsule     cyclobenzaprine (FLEXERIL) 10 mg tablet Take 1 Tablet by mouth nightly as needed for Muscle Spasm(s).  senna-docusate (PERICOLACE) 8.6-50 mg per tablet Take 1 Tablet by mouth daily.  escitalopram oxalate (LEXAPRO) 10 mg tablet Take 10 mg by mouth daily.  Accu-Chek Fastclix Lancet Drum misc     omeprazole (PRILOSEC) 40 mg capsule Take 40 mg by mouth daily.  triamterene-hydroCHLOROthiazide (MAXZIDE) 37.5-25 mg per tablet Take 1 Tablet by mouth daily.  metFORMIN ER (GLUCOPHAGE XR) 500 mg tablet Take 500 mg by mouth daily (with dinner).  allopurinol (ZYLOPRIM) 300 mg tablet Take 300 mg by mouth daily.  atorvastatin (LIPITOR) 20 mg tablet Take 20 mg by mouth daily.  apixaban (ELIQUIS) 5 mg tablet Take 5 mg by mouth two (2) times a day.  glipiZIDE (GLUCOTROL) 5 mg tablet Take 5 mg by mouth two (2) times a day.     omega-3 fatty acids/fish oil (FISH OIL EXTRA STRENGTH PO) Take 1 Tab by mouth daily.  sotalol (BETAPACE) 120 mg tablet Take 120 mg by mouth two (2) times a day. No current facility-administered medications for this visit. Social History     Socioeconomic History    Marital status:      Spouse name: Not on file    Number of children: Not on file    Years of education: Not on file    Highest education level: Not on file   Occupational History    Not on file   Tobacco Use    Smoking status: Never Smoker    Smokeless tobacco: Never Used   Vaping Use    Vaping Use: Never used   Substance and Sexual Activity    Alcohol use: No    Drug use: Never    Sexual activity: Yes     Partners: Male   Other Topics Concern    Not on file   Social History Narrative    Not on file     Social Determinants of Health     Financial Resource Strain:     Difficulty of Paying Living Expenses: Not on file   Food Insecurity:     Worried About Running Out of Food in the Last Year: Not on file    Jaison of Food in the Last Year: Not on file   Transportation Needs:     Lack of Transportation (Medical): Not on file    Lack of Transportation (Non-Medical):  Not on file   Physical Activity:     Days of Exercise per Week: Not on file    Minutes of Exercise per Session: Not on file   Stress:     Feeling of Stress : Not on file   Social Connections:     Frequency of Communication with Friends and Family: Not on file    Frequency of Social Gatherings with Friends and Family: Not on file    Attends Christian Services: Not on file    Active Member of Clubs or Organizations: Not on file    Attends Club or Organization Meetings: Not on file    Marital Status: Not on file   Intimate Partner Violence:     Fear of Current or Ex-Partner: Not on file    Emotionally Abused: Not on file    Physically Abused: Not on file    Sexually Abused: Not on file   Housing Stability:     Unable to Pay for Housing in the Last Year: Not on file    Number of Places Lived in the Last Year: Not on file    Unstable Housing in the Last Year: Not on file       Past Surgical History:   Procedure Laterality Date    HX BREAST RECONSTRUCTION Bilateral 2017    HX COLONOSCOPY      HX CYST REMOVAL  1974    HX ENDOSCOPY      HX HEENT      WISDOM TEETH X4    HX HERNIA REPAIR  2018    HX HYSTERECTOMY  1980s    HX MASTECTOMY Bilateral 2016    HX ORTHOPAEDIC  1980s    HAD TWO LUMBAR SURGERIES - PT UNSURE OF PROCEDURE    HX ORTHOPAEDIC  2003    CERVICAL PLATE INSERTED    HX ORTHOPAEDIC  2018    LEFT ANKLE TENDON REPAIR    HX OTHER SURGICAL  11/2021    Ablation of the heart     HX TONSILLECTOMY  CHILDHOOD    HX TUBAL LIGATION         Family History   Problem Relation Age of Onset    Stroke Mother     Diabetes Mother     Heart Disease Father     Stroke Father     Atrial Fibrillation Brother     Anesth Problems Neg Hx         OB History    No obstetric history on file. REVIEW OF SYSTEMS:    Patient denies any recent fever, chills, nausea, vomiting, chest pain, or shortness of breath. Vitals:  Ht 5' 6\" (1.676 m)   Wt 209 lb (94.8 kg)   BMI 33.73 kg/m²    Body mass index is 33.73 kg/m². PHYSICAL EXAM:  General exam: Patient is awake, alert, and oriented x3. Well-appearing. No acute distress. Ambulates with a normal gait. Right shoulder: Normal stability. Incision is well-healed with no erythema or ecchymosis. She has active forward flexion and abduction 100 degrees today. Improving rotator cuff strength.     IMAGING:    XR Results (most recent):  Results from Appointment encounter on 04/25/22    XR SHOULDER RT AP/LAT MIN 2 V    Narrative  X-rays of the right shoulder 3 views done today show evidence of a well-seated total shoulder prosthesis with no obvious signs of hardware failure or loosening      Results from Hospital Encounter encounter on 04/13/22    XR SHOULDER RT AP/LAT MIN 2 V    Narrative  EXAM: XR SHOULDER RT AP/LAT MIN 2 V    INDICATION: s/p R TSA. COMPARISON: None. FINDINGS: 2 views of the right shoulder demonstrate evidence of a right shoulder  arthroplasty, with satisfactory alignment of the prosthetic elements. The  visible right lung is hypoinspiratory and there are right basilar opacities,  nonspecific. .    Impression  Evidence of right shoulder arthroplasty. Hypoinspiratory right lung  with right basilar opacities may indicate atelectasis. Short-term follow-up  chest x-ray can be considered. Results from [place] encounter on 02/06/19    XR SPINE CERV PA LAT [place] 3 V MAX    Narrative  Compliance only    Indication: Spine fusion. Spot fluoroscopic AP and lateral views of the cervical spine demonstrate  anterior cervical fusion in progress from C5-C7. Impression  IMPRESSION: Anterior cervical fusion in progress. Orders Placed This Encounter    XR SHOULDER RT AP/LAT MIN 2 V     Standing Status:   Future     Standing Expiration Date:   7/20/2023              An electronic signature was used to authenticate this note.   -- Karen Arreola DO

## 2022-06-20 NOTE — LETTER
6/20/2022    Patient: Darell Durán   YOB: 1950   Date of Visit: 6/20/2022     [address], [place] [address]  Via Fax: [phone]    Dear Heir Holman DO,      Thank you for referring Ms. Elsie Penn to [facility] for evaluation. My notes for this consultation are attached. If you have questions, please do not hesitate to call me. I look forward to following your patient along with you.       Sincerely,    Yahaira Wagner DO

## 2022-08-22 ENCOUNTER — OFFICE VISIT (OUTPATIENT)
Dept: ORTHOPEDIC SURGERY | Age: 72
End: 2022-08-22
Payer: MEDICARE

## 2022-08-22 VITALS — HEIGHT: 66 IN | WEIGHT: 209 LBS | BODY MASS INDEX: 33.59 KG/M2

## 2022-08-22 DIAGNOSIS — Z09 SURGICAL FOLLOW-UP CARE: Primary | ICD-10-CM

## 2022-08-22 PROCEDURE — G8400 PT W/DXA NO RESULTS DOC: HCPCS | Performed by: ORTHOPAEDIC SURGERY

## 2022-08-22 PROCEDURE — 1123F ACP DISCUSS/DSCN MKR DOCD: CPT | Performed by: ORTHOPAEDIC SURGERY

## 2022-08-22 PROCEDURE — 1090F PRES/ABSN URINE INCON ASSESS: CPT | Performed by: ORTHOPAEDIC SURGERY

## 2022-08-22 PROCEDURE — G8417 CALC BMI ABV UP PARAM F/U: HCPCS | Performed by: ORTHOPAEDIC SURGERY

## 2022-08-22 PROCEDURE — 3017F COLORECTAL CA SCREEN DOC REV: CPT | Performed by: ORTHOPAEDIC SURGERY

## 2022-08-22 PROCEDURE — G9708 BILAT MAST/HX BI /UNILAT MAS: HCPCS | Performed by: ORTHOPAEDIC SURGERY

## 2022-08-22 PROCEDURE — G8427 DOCREV CUR MEDS BY ELIG CLIN: HCPCS | Performed by: ORTHOPAEDIC SURGERY

## 2022-08-22 PROCEDURE — 1101F PT FALLS ASSESS-DOCD LE1/YR: CPT | Performed by: ORTHOPAEDIC SURGERY

## 2022-08-22 PROCEDURE — G8432 DEP SCR NOT DOC, RNG: HCPCS | Performed by: ORTHOPAEDIC SURGERY

## 2022-08-22 PROCEDURE — G8536 NO DOC ELDER MAL SCRN: HCPCS | Performed by: ORTHOPAEDIC SURGERY

## 2022-08-22 PROCEDURE — 99212 OFFICE O/P EST SF 10 MIN: CPT | Performed by: ORTHOPAEDIC SURGERY

## 2022-08-22 RX ORDER — GLIPIZIDE 5 MG/1
TABLET, FILM COATED, EXTENDED RELEASE ORAL
COMMUNITY
Start: 2022-07-26

## 2022-08-22 NOTE — PROGRESS NOTES
Lawson King (: 1950) is a 66 y.o. female, established patient, here for evaluation of the following chief complaint(s):  Shoulder Pain (Right shoulder)       ASSESSMENT/PLAN:  Below is the assessment and plan developed based on review of pertinent history, physical exam, labs, studies, and medications. Overall she seems to be progressing with therapy. She will continue work on strengthening in the shoulder. I will plan to see her back in 2 months. 1. Surgical follow-up care  -     REFERRAL TO PHYSICAL THERAPY      Return in about 2 months (around 10/22/2022). SUBJECTIVE/OBJECTIVE:  Lawson King (: 1950) is a 66 y.o. female. She is now 4 months status post right total shoulder arthroplasty. Overall she notes improvement with therapy although it has been slow. No Known Allergies    Current Outpatient Medications   Medication Sig    glipiZIDE SR (GLUCOTROL XL) 5 mg CR tablet     digoxin (LANOXIN) 0.125 mg tablet     gabapentin (NEURONTIN) 100 mg capsule     cyclobenzaprine (FLEXERIL) 10 mg tablet Take 1 Tablet by mouth nightly as needed for Muscle Spasm(s). senna-docusate (PERICOLACE) 8.6-50 mg per tablet Take 1 Tablet by mouth daily. escitalopram oxalate (LEXAPRO) 10 mg tablet Take 10 mg by mouth daily. Accu-Chek Fastclix Lancet Drum misc     omeprazole (PRILOSEC) 40 mg capsule Take 40 mg by mouth daily. triamterene-hydroCHLOROthiazide (MAXZIDE) 37.5-25 mg per tablet Take 1 Tablet by mouth daily. metFORMIN ER (GLUCOPHAGE XR) 500 mg tablet Take 500 mg by mouth daily (with dinner). allopurinol (ZYLOPRIM) 300 mg tablet Take 300 mg by mouth daily. atorvastatin (LIPITOR) 20 mg tablet Take 20 mg by mouth daily. apixaban (ELIQUIS) 5 mg tablet Take 5 mg by mouth two (2) times a day. glipiZIDE (GLUCOTROL) 5 mg tablet Take 5 mg by mouth two (2) times a day. omega-3 fatty acids/fish oil (FISH OIL EXTRA STRENGTH PO) Take 1 Tab by mouth daily.     sotalol (BETAPACE) 120 mg tablet Take 120 mg by mouth two (2) times a day. No current facility-administered medications for this visit. Social History     Socioeconomic History    Marital status:      Spouse name: Not on file    Number of children: Not on file    Years of education: Not on file    Highest education level: Not on file   Occupational History    Not on file   Tobacco Use    Smoking status: Never    Smokeless tobacco: Never   Vaping Use    Vaping Use: Never used   Substance and Sexual Activity    Alcohol use: No    Drug use: Never    Sexual activity: Yes     Partners: Male   Other Topics Concern    Not on file   Social History Narrative    Not on file     Social Determinants of Health     Financial Resource Strain: Not on file   Food Insecurity: Not on file   Transportation Needs: Not on file   Physical Activity: Not on file   Stress: Not on file   Social Connections: Not on file   Intimate Partner Violence: Not on file   Housing Stability: Not on file       Past Surgical History:   Procedure Laterality Date    HX BREAST RECONSTRUCTION Bilateral 2017    HX COLONOSCOPY      HX CYST REMOVAL  1974    HX ENDOSCOPY      HX HEENT      WISDOM TEETH X4    HX HERNIA REPAIR  2018    HX HYSTERECTOMY  1980s    HX MASTECTOMY Bilateral 2016    HX ORTHOPAEDIC  1980s    HAD TWO LUMBAR SURGERIES - PT UNSURE OF PROCEDURE    HX ORTHOPAEDIC  2003    CERVICAL PLATE INSERTED    HX ORTHOPAEDIC  2018    LEFT ANKLE TENDON REPAIR    HX OTHER SURGICAL  11/2021    Ablation of the heart     HX TONSILLECTOMY  CHILDHOOD    HX TUBAL LIGATION         Family History   Problem Relation Age of Onset    Stroke Mother     Diabetes Mother     Heart Disease Father     Stroke Father     Atrial Fibrillation Brother     Anesth Problems Neg Hx         OB History    No obstetric history on file. REVIEW OF SYSTEMS:    Patient denies any recent fever, chills, nausea, vomiting, chest pain, or shortness of breath.       Vitals:  Ht 5' 6\" (1.676 m)   Wt 209 lb (94.8 kg)   BMI 33.73 kg/m²    Body mass index is 33.73 kg/m². PHYSICAL EXAM:  General exam: Patient is awake, alert, and oriented x3. Well-appearing. No acute distress. Ambulates with a normal gait. Right shoulder: Incision is well-healed. She does have some palpable knots in the area of her deltoid muscle. Active forward flexion abduction 100 degrees today. No obvious instability on testing. Improve strength noted with resisted internal and external rotation    IMAGING:    XR Results (most recent):  Results from Appointment encounter on 06/20/22    XR SHOULDER RT AP/LAT MIN 2 V    Narrative  X-rays of the right shoulder 3 views done today show evidence of a well-seated total shoulder prosthesis with no signs of hardware failure or loosening. Results from Appointment encounter on 04/25/22    XR SHOULDER RT AP/LAT MIN 2 V    Narrative  X-rays of the right shoulder 3 views done today show evidence of a well-seated total shoulder prosthesis with no obvious signs of hardware failure or loosening      Results from Hospital Encounter encounter on 04/13/22    XR SHOULDER RT AP/LAT MIN 2 V    Narrative  EXAM: XR SHOULDER RT AP/LAT MIN 2 V    INDICATION: s/p R TSA. COMPARISON: None. FINDINGS: 2 views of the right shoulder demonstrate evidence of a right shoulder  arthroplasty, with satisfactory alignment of the prosthetic elements. The  visible right lung is hypoinspiratory and there are right basilar opacities,  nonspecific. .    Impression  Evidence of right shoulder arthroplasty. Hypoinspiratory right lung  with right basilar opacities may indicate atelectasis. Short-term follow-up  chest x-ray can be considered.          Orders Placed This Encounter    REFERRAL TO PHYSICAL THERAPY     Referral Priority:   Routine     Referral Type:   PT/OT/ST     Referral Reason:   Specialty Services Required     Number of Visits Requested:   1              An electronic signature was used to authenticate this note.   -- Reyna Washington, DO

## 2022-08-22 NOTE — LETTER
8/22/2022    Patient: Otto Castorena   YOB: 1950   Date of Visit: 8/22/2022     S[address] Keene, [address] [place] [address]  Via Fax: [phone]    Dear Sivakumar Keene DO,      Thank you for referring Ms. Brianna Rudd to [facility] for evaluation. My notes for this consultation are attached. If you have questions, please do not hesitate to call me. I look forward to following your patient along with you.       Sincerely,    Bailey Haley DO

## 2024-08-22 ENCOUNTER — HOSPITAL ENCOUNTER (EMERGENCY)
Facility: HOSPITAL | Age: 74
Discharge: HOME | End: 2024-08-22
Attending: EMERGENCY MEDICINE
Payer: MEDICARE

## 2024-08-22 ENCOUNTER — APPOINTMENT (OUTPATIENT)
Facility: HOSPITAL | Age: 74
End: 2024-08-22
Payer: MEDICARE

## 2024-08-22 VITALS
WEIGHT: 225.53 LBS | TEMPERATURE: 98.4 F | HEART RATE: 89 BPM | BODY MASS INDEX: 36.25 KG/M2 | HEIGHT: 66 IN | RESPIRATION RATE: 19 BRPM | OXYGEN SATURATION: 92 % | SYSTOLIC BLOOD PRESSURE: 136 MMHG | DIASTOLIC BLOOD PRESSURE: 81 MMHG

## 2024-08-22 DIAGNOSIS — S16.1XXA STRAIN OF NECK MUSCLE, INITIAL ENCOUNTER: Primary | ICD-10-CM

## 2024-08-22 PROCEDURE — 6370000000 HC RX 637 (ALT 250 FOR IP): Performed by: EMERGENCY MEDICINE

## 2024-08-22 PROCEDURE — 6360000002 HC RX W HCPCS: Performed by: EMERGENCY MEDICINE

## 2024-08-22 PROCEDURE — 96372 THER/PROPH/DIAG INJ SC/IM: CPT

## 2024-08-22 PROCEDURE — 72125 CT NECK SPINE W/O DYE: CPT

## 2024-08-22 PROCEDURE — 99284 EMERGENCY DEPT VISIT MOD MDM: CPT

## 2024-08-22 RX ORDER — KETOROLAC TROMETHAMINE 30 MG/ML
30 INJECTION, SOLUTION INTRAMUSCULAR; INTRAVENOUS ONCE
Status: COMPLETED | OUTPATIENT
Start: 2024-08-22 | End: 2024-08-22

## 2024-08-22 RX ORDER — OXYCODONE HYDROCHLORIDE AND ACETAMINOPHEN 5; 325 MG/1; MG/1
1 TABLET ORAL ONCE
Status: COMPLETED | OUTPATIENT
Start: 2024-08-22 | End: 2024-08-22

## 2024-08-22 RX ORDER — TIZANIDINE 4 MG/1
4 TABLET ORAL EVERY 8 HOURS PRN
Qty: 9 TABLET | Refills: 0 | Status: SHIPPED | OUTPATIENT
Start: 2024-08-22 | End: 2024-08-22

## 2024-08-22 RX ORDER — OXYCODONE HYDROCHLORIDE AND ACETAMINOPHEN 5; 325 MG/1; MG/1
1 TABLET ORAL EVERY 6 HOURS PRN
Qty: 12 TABLET | Refills: 0 | Status: SHIPPED | OUTPATIENT
Start: 2024-08-22 | End: 2024-08-22

## 2024-08-22 RX ORDER — DIAZEPAM 5 MG/1
5 TABLET ORAL ONCE
Status: COMPLETED | OUTPATIENT
Start: 2024-08-22 | End: 2024-08-22

## 2024-08-22 RX ORDER — TIZANIDINE 4 MG/1
4 TABLET ORAL EVERY 8 HOURS PRN
Qty: 9 TABLET | Refills: 0 | Status: SHIPPED | OUTPATIENT
Start: 2024-08-22 | End: 2024-08-25

## 2024-08-22 RX ORDER — OXYCODONE HYDROCHLORIDE AND ACETAMINOPHEN 5; 325 MG/1; MG/1
1 TABLET ORAL EVERY 6 HOURS PRN
Qty: 12 TABLET | Refills: 0 | Status: SHIPPED | OUTPATIENT
Start: 2024-08-22 | End: 2024-08-25

## 2024-08-22 RX ADMIN — DIAZEPAM 5 MG: 5 TABLET ORAL at 09:20

## 2024-08-22 RX ADMIN — KETOROLAC TROMETHAMINE 30 MG: 30 INJECTION, SOLUTION INTRAMUSCULAR at 09:21

## 2024-08-22 RX ADMIN — OXYCODONE HYDROCHLORIDE AND ACETAMINOPHEN 1 TABLET: 5; 325 TABLET ORAL at 10:20

## 2024-08-22 ASSESSMENT — PAIN DESCRIPTION - LOCATION
LOCATION: NECK
LOCATION: NECK

## 2024-08-22 ASSESSMENT — PAIN SCALES - GENERAL
PAINLEVEL_OUTOF10: 10
PAINLEVEL_OUTOF10: 9

## 2024-08-22 NOTE — ED PROVIDER NOTES
[facility]  EMERGENCY DEPARTMENT ENCOUNTER      Pt Name: Kira Lanier  MRN: [id number]  Birthdate 1950  Date of evaluation: 8/22/2024  Provider: Katherine Ferreira DO    CHIEF COMPLAINT       Chief Complaint   Patient presents with    Neck Pain         HISTORY OF PRESENT ILLNESS   (Location/Symptom, Timing/Onset, Context/Setting, Quality, Duration, Modifying Factors, Severity)  Note limiting factors.   HPI      Review of External Medical Records:     Nursing Notes were reviewed.    REVIEW OF SYSTEMS    (2-9 systems for level 4, 10 or more for level 5)     Review of Systems    Except as noted above the remainder of the review of systems was reviewed and negative.       PAST MEDICAL HISTORY     Past Medical History:   Diagnosis Date    Adverse effect of anesthesia     hard to wake    Arrhythmia 2016    [facility] - CARDIOVERSION TO CORRECT    Arthritis     Cancer (HCC) 2015    BILATERAL BREAST CANCER    Diabetes (HCC)     DM2    Endocarditis 2014    HOSPITALIZED    Hypertension     Ill-defined condition     KIDNEY STONES    PONV (postoperative nausea and vomiting)     Psychiatric disorder     DEPRESSION    Sleep apnea     no cpap         SURGICAL HISTORY       Past Surgical History:   Procedure Laterality Date    BREAST RECONSTRUCTION Bilateral 2017    COLONOSCOPY      CYST REMOVAL  1974    HEENT      WISDOM TEETH X4    HERNIA REPAIR  2018    HYSTERECTOMY (CERVIX STATUS UNKNOWN)  1980s    MASTECTOMY Bilateral 2016    ORTHOPEDIC SURGERY  2018    LEFT ANKLE TENDON REPAIR    ORTHOPEDIC SURGERY  2003    CERVICAL PLATE INSERTED    ORTHOPEDIC SURGERY  1980s    HAD TWO LUMBAR SURGERIES - PT UNSURE OF PROCEDURE    OTHER SURGICAL HISTORY  11/2021    Ablation of the heart     TONSILLECTOMY  CHILDHOOD    TUBAL LIGATION      UPPER GASTROINTESTINAL ENDOSCOPY           CURRENT MEDICATIONS       Previous Medications    ALLOPURINOL (ZYLOPRIM) 300 MG TABLET    Take 1 tablet by mouth daily    APIXABAN (ELIQUIS) 5 MG TABS

## 2024-08-22 NOTE — ED TRIAGE NOTES
Pt c/o neck pain that radiates up into her head since taking a yoga class last week. Pt has a hx of neck surgeries.

## (undated) DEVICE — KENDALL SCD EXPRESS SLEEVES, KNEE LENGTH, MEDIUM: Brand: KENDALL SCD

## (undated) DEVICE — BONE WAX WHITE: Brand: BONE WAX WHITE

## (undated) DEVICE — INFECTION CONTROL KIT SYS

## (undated) DEVICE — TOWEL SURG W17XL27IN STD BLU COT NONFENESTRATED PREWASHED

## (undated) DEVICE — SPONGE: SPECIALTY PEANUT XR 100/CS: Brand: MEDICAL ACTION INDUSTRIES

## (undated) DEVICE — DRAPE XR C ARM 41X74IN LF --

## (undated) DEVICE — ZIP 8I SURGICAL SKIN CLOSURE DEVICE: Brand: ZIP 8I SURGICAL SKIN CLOSURE DEVICE

## (undated) DEVICE — SUTURE VCRL SZ 0 L36IN ABSRB UD L36MM CT-1 1/2 CIR J946H

## (undated) DEVICE — BASIN ST MAJOR-NO CAUTERY: Brand: MEDLINE INDUSTRIES, INC.

## (undated) DEVICE — SOLUTION IRRIG 1000ML STRL H2O USP PLAS POUR BTL

## (undated) DEVICE — SUTURE MCRYL SZ 3-0 L27IN ABSRB UD L19MM PS-2 3/8 CIR PRIM Y427H

## (undated) DEVICE — TOOL 14MH30 LEGEND 14CM 3MM: Brand: MIDAS REX ™

## (undated) DEVICE — STERILE POLYISOPRENE POWDER-FREE SURGICAL GLOVES WITH EMOLLIENT COATING: Brand: PROTEXIS

## (undated) DEVICE — 1200 GUARD II KIT W/5MM TUBE W/O VAC TUBE: Brand: GUARDIAN

## (undated) DEVICE — SOLUTION IRRIG 3000ML 0.9% SOD CHL USP UROMATIC PLAS CONT

## (undated) DEVICE — TRAP FLUID BUFFALO FLTR

## (undated) DEVICE — REM POLYHESIVE ADULT PATIENT RETURN ELECTRODE: Brand: VALLEYLAB

## (undated) DEVICE — DERMABOND SKIN ADH 0.7ML -- DERMABOND ADVANCED 12/BX

## (undated) DEVICE — NDL SPNE QNCKE 18GX3.5IN LF --

## (undated) DEVICE — SUTURE MCRYL SZ 4-0 L27IN ABSRB UD L19MM PS-2 1/2 CIR PRIM Y426H

## (undated) DEVICE — COVER,TABLE,HEAVY DUTY,60"X90",STRL: Brand: MEDLINE

## (undated) DEVICE — GLOVE ORANGE PI 7 1/2   MSG9075

## (undated) DEVICE — GAUZE SPONGES,12 PLY: Brand: CURITY

## (undated) DEVICE — PREP SKN PREVAIL 40ML APPL --

## (undated) DEVICE — DRAPE,REIN 53X77,STERILE: Brand: MEDLINE

## (undated) DEVICE — GLOVE ORTHO 7 1/2   MSG9475

## (undated) DEVICE — Device

## (undated) DEVICE — 3M™ IOBAN™ 2 ANTIMICROBIAL INCISE DRAPE 6651EZ: Brand: IOBAN™ 2

## (undated) DEVICE — BIPOLAR IRRIGATOR INTEGRATED TUBING AND BIPOLAR CORD SET, DISPOSABLE

## (undated) DEVICE — STOCKINETTE,IMPERVIOUS,12X48,STERILE: Brand: MEDLINE

## (undated) DEVICE — PACK,SHOULDER II,DRAPE: Brand: MEDLINE

## (undated) DEVICE — YANKAUER,SMOOTH HANDLE,HIGH CAPACITY: Brand: MEDLINE INDUSTRIES, INC.

## (undated) DEVICE — KENDALL DL ECG CABLE AND LEAD WIRE SYSTEM, 3-LEAD, SINGLE PATIENT USE: Brand: KENDALL

## (undated) DEVICE — SYR LR LCK 1ML GRAD NSAF 30ML --

## (undated) DEVICE — HANDPIECE SET WITH COAXIAL HIGH FLOW TIP AND SUCTION TUBE: Brand: INTERPULSE

## (undated) DEVICE — SUTURE VCRL 2-0 L27IN ABSRB UD CP-2 L26MM 1/2 CIR REV CUT J869H

## (undated) DEVICE — BANDAGE COBAN 4 IN COMPR W4INXL5YD FOAM COHESIVE QUIK STK SELF ADH SFT

## (undated) DEVICE — SOLUTION IV 1000ML 0.9% SOD CHL

## (undated) DEVICE — SUTURE ETHBND EXCEL SZ 5 L30IN NONABSORBABLE GRN L40MM V-37 MB66G

## (undated) DEVICE — SUTURE VCRL SZ 2-0 L36IN ABSRB UD L36MM CT-1 1/2 CIR J945H

## (undated) DEVICE — PACK SURG PROC KNEE USER GPS

## (undated) DEVICE — HYPODERMIC SAFETY NEEDLE: Brand: MAGELLAN

## (undated) DEVICE — COVER LT HNDL PLAS RIG 1 PER PK

## (undated) DEVICE — SUTURE VCRL SZ 4-0 L27IN ABSRB UD L17MM RB-1 1/2 CIR J214H

## (undated) DEVICE — COVER,MAYO STAND,STERILE: Brand: MEDLINE

## (undated) DEVICE — CATH IV AUTOGRD BC GRN 18GA 30 -- INSYTE

## (undated) DEVICE — DRAIN SURG W7MMXL20CM SIL FULL PERF HUBLESS FLAT RADPQ STRP

## (undated) DEVICE — DRAPE,U/ SHT,SPLIT,PLAS,STERIL: Brand: MEDLINE

## (undated) DEVICE — SCREW EXT FIX L14MM FOR DISTRCTN

## (undated) DEVICE — DRAPE,LAPAROTOMY,T,PEDI,STERILE: Brand: MEDLINE

## (undated) DEVICE — SUTURE MCRYL SZ 4-0 L18IN ABSRB UD L16MM PC-3 3/8 CIR PRIM Y845G

## (undated) DEVICE — SYRINGE, LUER LOCK, 30ML: Brand: MEDLINE

## (undated) DEVICE — LAMINECTOMY RICHMOND-LF: Brand: MEDLINE INDUSTRIES, INC.

## (undated) DEVICE — INSULATED BLADE ELECTRODE: Brand: EDGE

## (undated) DEVICE — SOLUTION SCRB 2OZ 10% POVIDONE IOD ANTIMIC BTL

## (undated) DEVICE — SMOKE EVACUATION PENCIL: Brand: VALLEYLAB

## (undated) DEVICE — DRILL BIT 7080510 11 MM DRILL BIT S

## (undated) DEVICE — TELFA NON-ADHERENT PADS PREPAK: Brand: TELFA

## (undated) DEVICE — 3M™ STERI-DRAPE™ U-DRAPE 1015: Brand: STERI-DRAPE™

## (undated) DEVICE — 2108 SERIES SAGITTAL BLADE (24.9 X 0.64 X 73.8MM)

## (undated) DEVICE — SOLUTION IV 250ML 0.9% SOD CHL CLR INJ FLX BG CONT PRT CLSR

## (undated) DEVICE — SPONGE LAP 18X18IN STRL -- 5/PK